# Patient Record
Sex: MALE | Race: WHITE | HISPANIC OR LATINO | URBAN - METROPOLITAN AREA
[De-identification: names, ages, dates, MRNs, and addresses within clinical notes are randomized per-mention and may not be internally consistent; named-entity substitution may affect disease eponyms.]

---

## 2019-10-31 ENCOUNTER — OUTPATIENT (OUTPATIENT)
Dept: OUTPATIENT SERVICES | Facility: HOSPITAL | Age: 59
LOS: 1 days | Discharge: HOME | End: 2019-10-31
Payer: COMMERCIAL

## 2019-10-31 DIAGNOSIS — R07.9 CHEST PAIN, UNSPECIFIED: ICD-10-CM

## 2019-10-31 PROCEDURE — 71046 X-RAY EXAM CHEST 2 VIEWS: CPT | Mod: 26

## 2022-10-26 ENCOUNTER — APPOINTMENT (OUTPATIENT)
Dept: HEMATOLOGY ONCOLOGY | Facility: CLINIC | Age: 62
End: 2022-10-26

## 2022-10-26 VITALS
DIASTOLIC BLOOD PRESSURE: 73 MMHG | TEMPERATURE: 98 F | BODY MASS INDEX: 32.4 KG/M2 | HEIGHT: 66.5 IN | SYSTOLIC BLOOD PRESSURE: 139 MMHG | HEART RATE: 82 BPM | WEIGHT: 204 LBS

## 2022-10-26 DIAGNOSIS — E78.00 PURE HYPERCHOLESTEROLEMIA, UNSPECIFIED: ICD-10-CM

## 2022-10-26 DIAGNOSIS — Z80.0 FAMILY HISTORY OF MALIGNANT NEOPLASM OF DIGESTIVE ORGANS: ICD-10-CM

## 2022-10-26 DIAGNOSIS — I10 ESSENTIAL (PRIMARY) HYPERTENSION: ICD-10-CM

## 2022-10-26 DIAGNOSIS — E04.1 NONTOXIC SINGLE THYROID NODULE: ICD-10-CM

## 2022-10-26 DIAGNOSIS — Z87.891 PERSONAL HISTORY OF NICOTINE DEPENDENCE: ICD-10-CM

## 2022-10-26 LAB
ALBUMIN SERPL ELPH-MCNC: 4.7 G/DL
ALP BLD-CCNC: 97 U/L
ALT SERPL-CCNC: 54 U/L
ANION GAP SERPL CALC-SCNC: 10 MMOL/L
AST SERPL-CCNC: 29 U/L
BASOPHILS # BLD AUTO: 0.09 K/UL
BASOPHILS NFR BLD AUTO: 1 %
BILIRUB DIRECT SERPL-MCNC: <0.2 MG/DL
BILIRUB INDIRECT SERPL-MCNC: >0.3 MG/DL
BILIRUB SERPL-MCNC: 0.5 MG/DL
BUN SERPL-MCNC: 15 MG/DL
CALCIUM SERPL-MCNC: 10.2 MG/DL
CHLORIDE SERPL-SCNC: 104 MMOL/L
CO2 SERPL-SCNC: 25 MMOL/L
CREAT SERPL-MCNC: 1 MG/DL
CRP SERPL-MCNC: 3 MG/L
EGFR: 85 ML/MIN/1.73M2
EOSINOPHIL # BLD AUTO: 0.21 K/UL
EOSINOPHIL NFR BLD AUTO: 2.4 %
ERYTHROCYTE [SEDIMENTATION RATE] IN BLOOD BY WESTERGREN METHOD: 6 MM/HR
GLUCOSE SERPL-MCNC: 101 MG/DL
HCT VFR BLD CALC: 44.2 %
HGB BLD-MCNC: 15.3 G/DL
IMM GRANULOCYTES NFR BLD AUTO: 0.7 %
IRON SATN MFR SERPL: 32 %
IRON SERPL-MCNC: 103 UG/DL
LYMPHOCYTES # BLD AUTO: 2.03 K/UL
LYMPHOCYTES NFR BLD AUTO: 23.4 %
MAN DIFF?: NORMAL
MCHC RBC-ENTMCNC: 28.9 PG
MCHC RBC-ENTMCNC: 34.6 G/DL
MCV RBC AUTO: 83.4 FL
MONOCYTES # BLD AUTO: 0.76 K/UL
MONOCYTES NFR BLD AUTO: 8.8 %
NEUTROPHILS # BLD AUTO: 5.53 K/UL
NEUTROPHILS NFR BLD AUTO: 63.7 %
PLATELET # BLD AUTO: 575 K/UL
POTASSIUM SERPL-SCNC: 5.4 MMOL/L
PROT SERPL-MCNC: 7.5 G/DL
RBC # BLD: 5.3 M/UL
RBC # FLD: 13.3 %
SODIUM SERPL-SCNC: 139 MMOL/L
T3 SERPL-MCNC: 126 NG/DL
T4 FREE SERPL-MCNC: 1.4 NG/DL
TIBC SERPL-MCNC: 325 UG/DL
TSH SERPL-ACNC: 2.45 UIU/ML
UIBC SERPL-MCNC: 222 UG/DL
WBC # FLD AUTO: 8.68 K/UL

## 2022-10-26 PROCEDURE — 99205 OFFICE O/P NEW HI 60 MIN: CPT

## 2022-10-26 NOTE — CONSULT LETTER
[Dear  ___] : Dear  [unfilled], [Consult Letter:] : I had the pleasure of evaluating your patient, [unfilled]. [Please see my note below.] : Please see my note below. [Sincerely,] : Sincerely, [FreeTextEntry3] : Jasper Jon

## 2022-10-26 NOTE — ASSESSMENT
[FreeTextEntry1] : # Thrombocytosis, noted since at least 2022\par - Labwork today: CBC, BMP, LFTs, JAK2, CALR, MPL, ESR, CRP, BCR-ABL, TSH, FT4, T3, flow cytometry, iron studies, ferritin level \par - US Abd ordered to r/o liver or spleen involvement , Rx given \par - requested copy of CT scan done in the last 3 years or so \par \par # Family history of Colon Cancer \par - father diagnosed in his 40s and this is his cause of death\par - sister diagnosed in her 50s and in remission \par - briefly discussed role of genetic counseling due to diagnosis of malignancy at early age ; patient verbalized understanding and states his family is not interested at this time \par \par RTC in 2 weeks , sooner if needed \par \par SEEN/examined w/ NP Ruth; note reviewed; case discussed\par primary vs secondary thrombocytosis\par will get labs and abdomen sono\par answered all questions

## 2022-10-26 NOTE — REVIEW OF SYSTEMS
[Negative] : Allergic/Immunologic [Fever] : no fever [Night Sweats] : no night sweats [Recent Change In Weight] : ~T no recent weight change [Chest Pain] : no chest pain [Shortness Of Breath] : no shortness of breath [Abdominal Pain] : no abdominal pain [Joint Pain] : no joint pain [Easy Bleeding] : no tendency for easy bleeding

## 2022-10-26 NOTE — HISTORY OF PRESENT ILLNESS
[de-identified] : Mr. KELTON CUETO is a 62 year old male here today for evaluation and management of thrombocytosis.\par \par He was referred by PCP, Dr. Garrett.  KELTON is a 62 year old M with PMHx including HTN, hypercholesterolemia, thyroid nodule and anxiety, who presents to clinic to establish care.   He follows with [facility] and was noted to have elevated PLTs which were then confirmed by PCP at a later date.  He is presently feeling well with no new complaints.  Patient denies fever, chills, nausea, vomiting, dyspnea, acute changes in vision, increasing frequent/severity of headaches, skin rash, joint aches, unintentional weight loss or bleeding.  Patient reports family history of malignancy in his father (dx in his 40s - colon cancer) and sister (colon cancer - dx in her 50s).\par \par RADIOLOGIC WORKUP\par CXR (11.1.2019) IMPRESSION:  No radiographic evidence of acute pulmonary disease.\par \par LAB WORKUP\par (9.7.2022) WBC 10.38, Hgb 15.4, MCV 86.2, RDW 13.2, , ANC 6.83, Mono 0.95\par (8.11.2022) WBC 10.14, Hgb 16.9 MCV 87.2, RDW 13.5, , ANC 6.6, Lymph 2.24, Mono 0.9\par (7.31.2022) WBC 8.6, Hgb 16.4, \par \par HCM\par Colonoscopy done 2020 with Dr. Ayala reportedly benign \par COVID Vaccinated

## 2022-10-27 LAB — FERRITIN SERPL-MCNC: 281 NG/ML

## 2022-10-28 ENCOUNTER — OUTPATIENT (OUTPATIENT)
Dept: OUTPATIENT SERVICES | Facility: HOSPITAL | Age: 62
LOS: 1 days | Discharge: HOME | End: 2022-10-28

## 2022-10-28 DIAGNOSIS — D75.839 THROMBOCYTOSIS, UNSPECIFIED: ICD-10-CM

## 2022-10-28 LAB
MPL EXON 10 MUTATION: NORMAL
T(9;22)(ABL1,BCR)/CONTROL BLD/T: NORMAL

## 2022-10-28 PROCEDURE — 76700 US EXAM ABDOM COMPLETE: CPT | Mod: 26

## 2022-10-31 ENCOUNTER — TRANSCRIPTION ENCOUNTER (OUTPATIENT)
Age: 62
End: 2022-10-31

## 2022-11-01 LAB — GENE XXX MUT ANL BLD/T: NORMAL

## 2022-11-04 LAB
EXTRACTION: NORMAL
JAK2 P.V617F BLD/T QL: ABNORMAL
LAB DIRECTOR NAME PROVIDER: NORMAL
LABORATORY COMMENT REPORT: NORMAL
REFLEX:: NORMAL

## 2022-11-29 ENCOUNTER — APPOINTMENT (OUTPATIENT)
Dept: HEMATOLOGY ONCOLOGY | Facility: CLINIC | Age: 62
End: 2022-11-29

## 2022-11-29 VITALS
OXYGEN SATURATION: 99 % | RESPIRATION RATE: 16 BRPM | WEIGHT: 212 LBS | DIASTOLIC BLOOD PRESSURE: 83 MMHG | HEART RATE: 78 BPM | BODY MASS INDEX: 33.67 KG/M2 | SYSTOLIC BLOOD PRESSURE: 136 MMHG | TEMPERATURE: 98.2 F | HEIGHT: 66.5 IN

## 2022-11-29 PROCEDURE — 99215 OFFICE O/P EST HI 40 MIN: CPT

## 2022-11-29 NOTE — ASSESSMENT
[FreeTextEntry1] : # JAK2 (+) Essential Thrombocytosis \par - reviewed radiologic workup and labs report including implications of diagnosis and management using baby ASA + cytoreductive therapy\par - US Abd 10/2022 shows heterogeneous echotexture of the liver, suggestive of fatty liver \par - START Hydrea 500mg once daily ; side effects discussed, written information provided + Rx sent ; Tasked Clinical Pharmacist, Shyla Lemus, to contact patient to ensure receipt of medication and reiterate instructions/adverse event profile.\par - c/w baby ASA daily , no refills needed \par \par # Family history of Colon Cancer \par - father diagnosed in his 40s and this is his cause of death\par - sister diagnosed in her 50s and in remission \par - briefly discussed role of genetic counseling due to diagnosis of malignancy at early age ; patient verbalized understanding and states his family is not interested at this time \par \par Encouraged to achieve healthy body weight through lifestyle modification which may include increased physical activity, as tolerated, and dietary modifications/portion control.\par \par RTC in 2 weeks with CBC , sooner if needed \par \par seen/examined w/ NP Ruth; note reviewed;case discussed\par 63 yo man with ECOG 1 was found to have thrombocytosis (platelets count is in 500K). \par JAK2 positive myeloproliferative neoplasm/ essential thrombocytosis, \par in view of the age (>61 yo) and JAK2 status, pt has a  high risk \par - explained that pt has fatty liver among other comorbidities which could contribute to elevated platelets count\par - he has no anemia and no splenomegaly; he has no B symptoms; his platelets count (500Ks) \par - explained that this entity is characterized as a neoplasm; however, the prognosis should be encouraging\par - to prevent arterial thrombosis, continue ASA 81MG\par - to decrease the risk of venous thrombosis, start hydrea 500mg daily; script sent 11/29/22; f/u in 2 weeks\par - explained that if left untreated and in those patients who have an advanced presentation, this entity could lead to acute leukemia, myelofibrosis, among other condidtions; however, he is far from this presentation; \par -he appeared to be emotionally affected upon learning the diagnosis\par - will follow closely

## 2022-11-29 NOTE — HISTORY OF PRESENT ILLNESS
[de-identified] : Mr. KELTON CUETO is a 62 year old male here today for evaluation and management of thrombocytosis.\par \par He was referred by PCP, Dr. Garrett.  KELTON is a 62 year old M with PMHx including HTN, hypercholesterolemia, YESSICA, asthma, GERD, thyroid nodule and anxiety, who presents to clinic to establish care.   He follows with [facility] and was noted to have elevated PLTs which were then confirmed by PCP at a later date.  He is presently feeling well with no new complaints.  Patient denies fever, chills, nausea, vomiting, dyspnea, acute changes in vision, increasing frequent/severity of headaches, skin rash, joint aches, unintentional weight loss or bleeding.  Patient reports family history of malignancy in his father (dx in his 40s - colon cancer) and sister (colon cancer - dx in her 50s).\par \par RADIOLOGIC WORKUP\par CXR (11.1.2019) IMPRESSION:  No radiographic evidence of acute pulmonary disease.\par \par LAB WORKUP\par (9.7.2022) WBC 10.38, Hgb 15.4, MCV 86.2, RDW 13.2, , ANC 6.83, Mono 0.95\par (8.11.2022) WBC 10.14, Hgb 16.9 MCV 87.2, RDW 13.5, , ANC 6.6, Lymph 2.24, Mono 0.9\par (7.31.2022) WBC 8.6, Hgb 16.4, \par \par HCM\par Colonoscopy done 2020 with Dr. Ayala reportedly benign \par COVID Vaccinated  [de-identified] : 11/29/22\par Patient is here for a follow-up visit for thrombocytosis.  He is feeling well with no new complaints.  Reviewed most recent CBC, which showed persistent thrombocytosis with PLTs 575,000.  Additional workup reveals JAK2 (+), establishing diagnosis of Essential Thrombocytosis.  Reviewed US Abd which shows heterogeneous echotexture of the liver.\par US Abd (10.28.2022) IMPRESSION:Heterogeneous echotexture of the liver.Bilateral nonobstructing nephrolithiasis.

## 2022-11-29 NOTE — REASON FOR VISIT
[Thrombocytosis] : thrombocytosis [Follow-Up Visit] : a follow-up visit for [FreeTextEntry2] : JAK2 (+) ET

## 2022-12-12 ENCOUNTER — APPOINTMENT (OUTPATIENT)
Dept: HEMATOLOGY ONCOLOGY | Facility: CLINIC | Age: 62
End: 2022-12-12

## 2022-12-12 DIAGNOSIS — Z00.00 ENCOUNTER FOR GENERAL ADULT MEDICAL EXAMINATION W/OUT ABNORMAL FINDINGS: ICD-10-CM

## 2022-12-13 ENCOUNTER — APPOINTMENT (OUTPATIENT)
Dept: HEMATOLOGY ONCOLOGY | Facility: CLINIC | Age: 62
End: 2022-12-13

## 2022-12-13 VITALS
HEART RATE: 97 BPM | RESPIRATION RATE: 16 BRPM | DIASTOLIC BLOOD PRESSURE: 97 MMHG | TEMPERATURE: 97.9 F | BODY MASS INDEX: 33.03 KG/M2 | WEIGHT: 208 LBS | SYSTOLIC BLOOD PRESSURE: 163 MMHG | HEIGHT: 66.5 IN | OXYGEN SATURATION: 98 %

## 2022-12-13 LAB
BASOPHILS # BLD AUTO: 0.08 K/UL
BASOPHILS NFR BLD AUTO: 0.7 %
EOSINOPHIL # BLD AUTO: 0.29 K/UL
EOSINOPHIL NFR BLD AUTO: 2.7 %
HCT VFR BLD CALC: 44.8 %
HGB BLD-MCNC: 14.9 G/DL
IMM GRANULOCYTES NFR BLD AUTO: 0.5 %
LYMPHOCYTES # BLD AUTO: 2.29 K/UL
LYMPHOCYTES NFR BLD AUTO: 21 %
MAN DIFF?: NORMAL
MCHC RBC-ENTMCNC: 28.8 PG
MCHC RBC-ENTMCNC: 33.3 G/DL
MCV RBC AUTO: 86.5 FL
MONOCYTES # BLD AUTO: 0.86 K/UL
MONOCYTES NFR BLD AUTO: 7.9 %
NEUTROPHILS # BLD AUTO: 7.34 K/UL
NEUTROPHILS NFR BLD AUTO: 67.2 %
PLATELET # BLD AUTO: 600 K/UL
RBC # BLD: 5.18 M/UL
RBC # FLD: 13.3 %
WBC # FLD AUTO: 10.91 K/UL

## 2022-12-13 PROCEDURE — 99214 OFFICE O/P EST MOD 30 MIN: CPT

## 2022-12-13 NOTE — ASSESSMENT
[FreeTextEntry1] : # JAK2 (+) Essential Thrombocytosis \par - reviewed radiologic workup and labs report including implications of diagnosis and management using baby ASA + cytoreductive therapy\par - US Abd 10/2022 shows heterogeneous echotexture of the liver, suggestive of fatty liver \par - c/w Hydrea 500mg once daily, initiated 12.3.2022\par - if no improvement in PLTs by next visit, will increase to 1 tab alternating with 2 tabs every other day \par - c/w baby ASA daily , no refills needed \par \par # Family history of Colon Cancer \par - father diagnosed in his 40s and this is his cause of death\par - sister diagnosed in her 50s and in remission \par - briefly discussed role of genetic counseling due to diagnosis of malignancy at early age ; patient verbalized understanding and states his family is not interested at this time \par \par Encouraged to achieve healthy body weight through lifestyle modification which may include increased physical activity, as tolerated, and dietary modifications/portion control.\par \par RTC in 3 weeks with SMART NP with CBC prior\par RTC in 6 weeks with Dr. Jon with CBC, BMP, LFTs prior \par \par seen/examined w/ NP CLance; note reviewed;case discussed\par 61 yo man with ECOG 1 was found to have thrombocytosis (platelets count is in 500K). \par JAK2 positive myeloproliferative neoplasm/ essential thrombocytosis, \par in view of the age (>59 yo) and JAK2 status, pt has a  high risk \par - explained that pt has fatty liver among other comorbidities which could contribute to elevated platelets count\par - he has no anemia and no splenomegaly; he has no B symptoms; his platelets count (500Ks) \par - explained that this entity is characterized as a neoplasm; however, the prognosis should be encouraging\par - to prevent arterial thrombosis, continue ASA 81MG\par - to decrease the risk of venous thrombosis, initiatated daily  hydrea 500mg 12/3/22; platelets count remain the same; continue at the same dose and f/u in 3-4 weeks; will adjust pending platelets results\par - explained that if left untreated and in those patients who have an advanced presentation, this entity could lead to acute leukemia, myelofibrosis, among other condidtions; however, he is far from this presentation; \par -he appeared to be emotionally affected upon learning the diagnosis\par

## 2022-12-13 NOTE — HISTORY OF PRESENT ILLNESS
[de-identified] : Mr. KELTON CUETO is a 62 year old male here today for evaluation and management of thrombocytosis.\par \par He was referred by PCP, Dr. Garrett.  KELTON is a 62 year old M with PMHx including HTN, hypercholesterolemia, YESSICA, asthma, GERD, thyroid nodule and anxiety, who presents to clinic to establish care.   He follows with [facility] and was noted to have elevated PLTs which were then confirmed by PCP at a later date.  He is presently feeling well with no new complaints.  Patient denies fever, chills, nausea, vomiting, dyspnea, acute changes in vision, increasing frequent/severity of headaches, skin rash, joint aches, unintentional weight loss or bleeding.  Patient reports family history of malignancy in his father (dx in his 40s - colon cancer) and sister (colon cancer - dx in her 50s).\par \par RADIOLOGIC WORKUP\par CXR (11.1.2019) IMPRESSION:  No radiographic evidence of acute pulmonary disease.\par \par LAB WORKUP\par (9.7.2022) WBC 10.38, Hgb 15.4, MCV 86.2, RDW 13.2, , ANC 6.83, Mono 0.95\par (8.11.2022) WBC 10.14, Hgb 16.9 MCV 87.2, RDW 13.5, , ANC 6.6, Lymph 2.24, Mono 0.9\par (7.31.2022) WBC 8.6, Hgb 16.4, \par \par HCM\par Colonoscopy done 2020 with Dr. Ayala reportedly benign \par COVID Vaccinated  [de-identified] : 11/29/22\par Patient is here for a follow-up visit for thrombocytosis.  He is feeling well with no new complaints.  Reviewed most recent CBC, which showed persistent thrombocytosis with PLTs 575,000.  Additional workup reveals JAK2 (+), establishing diagnosis of Essential Thrombocytosis.  Reviewed US Abd which shows heterogeneous echotexture of the liver.\par US Abd (10.28.2022) IMPRESSION:Heterogeneous echotexture of the liver.Bilateral nonobstructing nephrolithiasis.\par \par 12/13/22\par Patient is here for a follow-up visit for ET.  He is feeling well with no new complaints.  Reviewed most recent CBC, which shows persistent thrombocytosis with PLTs 600,000.  He initiated Hydrea 500mg once daily on 12.3.2022.  Patient endorses minor cramps, but they are not bothersome.

## 2022-12-13 NOTE — REASON FOR VISIT
[Follow-Up Visit] : a follow-up visit for [Thrombocytosis] : thrombocytosis [FreeTextEntry2] : JAK2 (+) ET

## 2023-01-11 ENCOUNTER — LABORATORY RESULT (OUTPATIENT)
Age: 63
End: 2023-01-11

## 2023-01-11 ENCOUNTER — APPOINTMENT (OUTPATIENT)
Dept: HEMATOLOGY ONCOLOGY | Facility: CLINIC | Age: 63
End: 2023-01-11
Payer: COMMERCIAL

## 2023-01-11 ENCOUNTER — OUTPATIENT (OUTPATIENT)
Dept: OUTPATIENT SERVICES | Facility: HOSPITAL | Age: 63
LOS: 1 days | End: 2023-01-11

## 2023-01-11 VITALS
BODY MASS INDEX: 32.87 KG/M2 | HEART RATE: 89 BPM | WEIGHT: 207 LBS | HEIGHT: 66.5 IN | TEMPERATURE: 97.6 F | SYSTOLIC BLOOD PRESSURE: 143 MMHG | OXYGEN SATURATION: 98 % | DIASTOLIC BLOOD PRESSURE: 91 MMHG

## 2023-01-11 DIAGNOSIS — Z51.81 ENCOUNTER FOR THERAPEUTIC DRUG LEVEL MONITORING: ICD-10-CM

## 2023-01-11 DIAGNOSIS — D47.3 ESSENTIAL (HEMORRHAGIC) THROMBOCYTHEMIA: ICD-10-CM

## 2023-01-11 DIAGNOSIS — D75.839 THROMBOCYTOSIS, UNSPECIFIED: ICD-10-CM

## 2023-01-11 LAB
HCT VFR BLD CALC: 47.4 %
HGB BLD-MCNC: 15.8 G/DL
MCHC RBC-ENTMCNC: 28.5 PG
MCHC RBC-ENTMCNC: 33.3 G/DL
MCV RBC AUTO: 85.4 FL
PLATELET # BLD AUTO: 384 K/UL
PMV BLD: 10.4 FL
RBC # BLD: 5.55 M/UL
RBC # FLD: 14.7 %
WBC # FLD AUTO: 9.85 K/UL

## 2023-01-11 PROCEDURE — 99214 OFFICE O/P EST MOD 30 MIN: CPT

## 2023-01-11 NOTE — HISTORY OF PRESENT ILLNESS
[de-identified] : Mr. KELTON CUETO is a 62 year old male here today for evaluation and management of thrombocytosis.\par \par He was referred by PCP, Dr. Garrett.  KELTON is a 62 year old M with PMHx including HTN, hypercholesterolemia, YESSICA, asthma, GERD, thyroid nodule and anxiety, who presents to clinic to establish care.   He follows with [facility] and was noted to have elevated PLTs which were then confirmed by PCP at a later date.  He is presently feeling well with no new complaints.  Patient denies fever, chills, nausea, vomiting, dyspnea, acute changes in vision, increasing frequent/severity of headaches, skin rash, joint aches, unintentional weight loss or bleeding.  Patient reports family history of malignancy in his father (dx in his 40s - colon cancer) and sister (colon cancer - dx in her 50s).\par \par RADIOLOGIC WORKUP\par CXR (11.1.2019) IMPRESSION:  No radiographic evidence of acute pulmonary disease.\par \par LAB WORKUP\par (9.7.2022) WBC 10.38, Hgb 15.4, MCV 86.2, RDW 13.2, , ANC 6.83, Mono 0.95\par (8.11.2022) WBC 10.14, Hgb 16.9 MCV 87.2, RDW 13.5, , ANC 6.6, Lymph 2.24, Mono 0.9\par (7.31.2022) WBC 8.6, Hgb 16.4, \par \par HCM\par Colonoscopy done 2020 with Dr. Ayala reportedly benign \par COVID Vaccinated  [de-identified] : 11/29/22\par Patient is here for a follow-up visit for thrombocytosis.  He is feeling well with no new complaints.  Reviewed most recent CBC, which showed persistent thrombocytosis with PLTs 575,000.  Additional workup reveals JAK2 (+), establishing diagnosis of Essential Thrombocytosis.  Reviewed US Abd which shows heterogeneous echotexture of the liver.\par US Abd (10.28.2022) IMPRESSION:Heterogeneous echotexture of the liver.Bilateral nonobstructing nephrolithiasis.\par \par 12/13/22\par Patient is here for a follow-up visit for ET.  He is feeling well with no new complaints.  Reviewed most recent CBC, which shows persistent thrombocytosis with PLTs 600,000.  He initiated Hydrea 500mg once daily on 12.3.2022.  Patient endorses minor cramps, but they are not bothersome.  \par \par 1/11/23\par Patient is here for a follow-up visit for ET.  He is feeling well with no new complaints.  Reviewed most recent CBC, which shows persistent thrombocytosis with PLTs 384,000.  He continues on Hydrea 500mg once daily, initiated on 12.3.2022.  Patient denies fever, chills, nausea, vomiting, GI upset, CP, palpitations, dyspnea, ulcers or bleeding.

## 2023-01-11 NOTE — CONSULT LETTER
[Dear  ___] : Dear  [unfilled], [Consult Letter:] : I had the pleasure of evaluating your patient, [unfilled]. [Please see my note below.] : Please see my note below. [Sincerely,] : Sincerely, [FreeTextEntry3] : Jasper Jon\toño Boyle NP

## 2023-01-11 NOTE — ASSESSMENT
[FreeTextEntry1] : # JAK2 (+) Essential Thrombocytosis \par - reviewed radiologic workup and labs report including implications of diagnosis and management using baby ASA + cytoreductive therapy\par - US Abd 10/2022 shows heterogeneous echotexture of the liver, suggestive of fatty liver \par - c/w Hydrea 500mg once daily, initiated 12.3.2022\par - if no improvement in PLTs by next visit, will increase to 1 tab alternating with 2 tabs every other day \par - c/w baby ASA daily , no refills needed \par \par # Family history of Colon Cancer \par - father diagnosed in his 40s and this is his cause of death\par - sister diagnosed in her 50s and in remission \par - briefly discussed role of genetic counseling due to diagnosis of malignancy at early age ; patient verbalized understanding and states his family is not interested at this time \par \par Encouraged to achieve healthy body weight through lifestyle modification which may include increased physical activity, as tolerated, and dietary modifications/portion control. \par \par RTC in 3 weeks with Dr. Jon with CBC, BMP, LFTs prior \par \par 63 yo man with ECOG 1 was found to have thrombocytosis (platelets count is in 500K). \par JAK2 positive myeloproliferative neoplasm/ essential thrombocytosis, \par in view of the age (>61 yo) and JAK2 status, pt has a  high risk \par - explained that pt has fatty liver among other comorbidities which could contribute to elevated platelets count\par - he has no anemia and no splenomegaly; he has no B symptoms; his platelets count (500Ks) \par - explained that this entity is characterized as a neoplasm; however, the prognosis should be encouraging\par - to prevent arterial thrombosis, continue ASA 81MG\par - to decrease the risk of venous thrombosis, initiatated daily  hydrea 500mg 12/3/22; platelets count remain the same; continue at the same dose and f/u in 3-4 weeks; will adjust pending platelets results\par - explained that if left untreated and in those patients who have an advanced presentation, this entity could lead to acute leukemia, myelofibrosis, among other condidtions; however, he is far from this presentation

## 2023-01-31 ENCOUNTER — APPOINTMENT (OUTPATIENT)
Dept: HEMATOLOGY ONCOLOGY | Facility: CLINIC | Age: 63
End: 2023-01-31

## 2023-02-21 ENCOUNTER — OUTPATIENT (OUTPATIENT)
Dept: OUTPATIENT SERVICES | Facility: HOSPITAL | Age: 63
LOS: 1 days | End: 2023-02-21
Payer: COMMERCIAL

## 2023-02-21 ENCOUNTER — APPOINTMENT (OUTPATIENT)
Dept: HEMATOLOGY ONCOLOGY | Facility: CLINIC | Age: 63
End: 2023-02-21

## 2023-02-21 ENCOUNTER — LABORATORY RESULT (OUTPATIENT)
Age: 63
End: 2023-02-21

## 2023-02-21 ENCOUNTER — APPOINTMENT (OUTPATIENT)
Dept: HEMATOLOGY ONCOLOGY | Facility: CLINIC | Age: 63
End: 2023-02-21
Payer: COMMERCIAL

## 2023-02-21 VITALS
HEIGHT: 65 IN | TEMPERATURE: 98 F | RESPIRATION RATE: 16 BRPM | HEART RATE: 86 BPM | DIASTOLIC BLOOD PRESSURE: 112 MMHG | SYSTOLIC BLOOD PRESSURE: 159 MMHG | BODY MASS INDEX: 34.66 KG/M2 | WEIGHT: 208 LBS

## 2023-02-21 DIAGNOSIS — D47.3 ESSENTIAL (HEMORRHAGIC) THROMBOCYTHEMIA: ICD-10-CM

## 2023-02-21 LAB
ALBUMIN SERPL ELPH-MCNC: 4.6 G/DL
ALP BLD-CCNC: 89 U/L
ALT SERPL-CCNC: 28 U/L
ANION GAP SERPL CALC-SCNC: 12 MMOL/L
AST SERPL-CCNC: 25 U/L
BILIRUB SERPL-MCNC: 0.6 MG/DL
BUN SERPL-MCNC: 15 MG/DL
CALCIUM SERPL-MCNC: 9.5 MG/DL
CHLORIDE SERPL-SCNC: 102 MMOL/L
CO2 SERPL-SCNC: 23 MMOL/L
CREAT SERPL-MCNC: 0.9 MG/DL
EGFR: 97 ML/MIN/1.73M2
GLUCOSE SERPL-MCNC: 98 MG/DL
HCT VFR BLD CALC: 43.5 %
HGB BLD-MCNC: 14.9 G/DL
MCHC RBC-ENTMCNC: 30.3 PG
MCHC RBC-ENTMCNC: 34.3 G/DL
MCV RBC AUTO: 88.4 FL
PLATELET # BLD AUTO: 368 K/UL
PMV BLD: 9.1 FL
POTASSIUM SERPL-SCNC: 5.1 MMOL/L
PROT SERPL-MCNC: 7.2 G/DL
RBC # BLD: 4.92 M/UL
RBC # FLD: 16 %
SODIUM SERPL-SCNC: 137 MMOL/L
WBC # FLD AUTO: 5.33 K/UL

## 2023-02-21 PROCEDURE — 99214 OFFICE O/P EST MOD 30 MIN: CPT

## 2023-02-21 PROCEDURE — 80053 COMPREHEN METABOLIC PANEL: CPT

## 2023-02-21 PROCEDURE — 85027 COMPLETE CBC AUTOMATED: CPT

## 2023-02-21 NOTE — ASSESSMENT
[FreeTextEntry1] : # JAK2 (+) Essential Thrombocytosis \par - reviewed radiologic workup and labs report including implications of diagnosis and management using baby ASA + cytoreductive therapy \par - US Abd 10/2022 shows heterogeneous echotexture of the liver, suggestive of fatty liver \par - HOLD Hydrea 500mg due to fatigue/weakness as of 2/21/23 , initiated 12.3.2022 \par - c/w baby ASA daily , no refills needed \par \par # Family history of Colon Cancer \par - father diagnosed in his 40s and this is his cause of death \par - sister diagnosed in her 50s and in remission \par - briefly discussed role of genetic counseling due to diagnosis of malignancy at early age ; patient verbalized understanding and states his family is not interested at this time \par \par Encouraged to achieve healthy body weight through lifestyle modification which may include increased physical activity, as tolerated, and dietary modifications/portion control. \par \par RTC in 3 weeks with CBC, BMP, LFTs prior \par \par seen/ examined w/ NP C[facility]; note reviewed; case discussed\par 63 yo man with ECOG 1 was found to have thrombocytosis (platelets count is in 500K). \par JAK2 positive myeloproliferative neoplasm/ essential thrombocytosis, \par in view of the age (>61 yo) and JAK2 status, pt has a  high risk \par - explained that pt has fatty liver among other comorbidities which could contribute to elevated platelets count\par - he has no anemia and no splenomegaly; he has no B symptoms; his platelets count (500Ks) \par - explained that this entity is characterized as a neoplasm; however, the prognosis should be encouraging\par - to prevent arterial thrombosis, continue ASA 81MG\par - to decrease the risk of venous thrombosis, initiatated daily  hydrea 500mg 12/3/22; platelets count remain the same; continue at the same dose and f/u in 3-4 weeks; will adjust pending platelets results\par - explained that if left untreated and in those patients who have an advanced presentation, this entity could lead to acute leukemia, myelofibrosis, among other condidtions; however, he is far from this presentation\par \par as of 2/21/23, he c/o weakness and GI symtpoms and is concerned if hydrea is the cause: i recommend to HOLD hydrea for 3 weeks to assess if this represents the etiology; f/u labs after that

## 2023-02-21 NOTE — HISTORY OF PRESENT ILLNESS
[de-identified] : Mr. KELTON CUETO is a 62 year old male here today for evaluation and management of thrombocytosis.\par \par He was referred by PCP, Dr. Garrett.  KELTON is a 62 year old M with PMHx including HTN, hypercholesterolemia, YESSICA, asthma, GERD, thyroid nodule and anxiety, who presents to clinic to establish care.   He follows with [facility] and was noted to have elevated PLTs which were then confirmed by PCP at a later date.  He is presently feeling well with no new complaints.  Patient denies fever, chills, nausea, vomiting, dyspnea, acute changes in vision, increasing frequent/severity of headaches, skin rash, joint aches, unintentional weight loss or bleeding.  Patient reports family history of malignancy in his father (dx in his 40s - colon cancer) and sister (colon cancer - dx in her 50s).\par \par RADIOLOGIC WORKUP\par CXR (11.1.2019) IMPRESSION:  No radiographic evidence of acute pulmonary disease.\par \par LAB WORKUP\par (9.7.2022) WBC 10.38, Hgb 15.4, MCV 86.2, RDW 13.2, , ANC 6.83, Mono 0.95\par (8.11.2022) WBC 10.14, Hgb 16.9 MCV 87.2, RDW 13.5, , ANC 6.6, Lymph 2.24, Mono 0.9\par (7.31.2022) WBC 8.6, Hgb 16.4, \par \par HCM\par Colonoscopy done 2020 with Dr. Ayala reportedly benign \par COVID Vaccinated  [de-identified] : 11/29/22\par Patient is here for a follow-up visit for thrombocytosis.  He is feeling well with no new complaints.  Reviewed most recent CBC, which showed persistent thrombocytosis with PLTs 575,000.  Additional workup reveals JAK2 (+), establishing diagnosis of Essential Thrombocytosis.  Reviewed US Abd which shows heterogeneous echotexture of the liver.\par US Abd (10.28.2022) IMPRESSION:Heterogeneous echotexture of the liver.Bilateral nonobstructing nephrolithiasis.\par \par 12/13/22\par Patient is here for a follow-up visit for ET.  He is feeling well with no new complaints.  Reviewed most recent CBC, which shows persistent thrombocytosis with PLTs 600,000.  He initiated Hydrea 500mg once daily on 12.3.2022.  Patient endorses minor cramps, but they are not bothersome.  \par \par 1/11/23\par Patient is here for a follow-up visit for ET.  He is feeling well with no new complaints.  Reviewed most recent CBC, which shows PLTs 384,000.  He continues on Hydrea 500mg once daily, initiated on 12.3.2022.  Patient denies fever, chills, nausea, vomiting, GI upset, CP, palpitations, dyspnea, ulcers or bleeding.  \par \par 2/21/23\par Patient is here for a follow-up visit for ET.  He is feeling well with no new complaints.  Reviewed most recent CBC, which shows stable PLTs at 368,000.  He states he has been taking Hydrea 500mg twice daily.  Patient denies fever, chills, nausea, vomiting, GI upset, CP, palpitations, dyspnea, ulcers or bleeding.  He endorses more fatigue since being on Hydrea regimen.

## 2023-02-22 DIAGNOSIS — D47.3 ESSENTIAL (HEMORRHAGIC) THROMBOCYTHEMIA: ICD-10-CM

## 2023-03-15 ENCOUNTER — OUTPATIENT (OUTPATIENT)
Dept: OUTPATIENT SERVICES | Facility: HOSPITAL | Age: 63
LOS: 1 days | End: 2023-03-15
Payer: COMMERCIAL

## 2023-03-15 ENCOUNTER — APPOINTMENT (OUTPATIENT)
Dept: HEMATOLOGY ONCOLOGY | Facility: CLINIC | Age: 63
End: 2023-03-15
Payer: COMMERCIAL

## 2023-03-15 ENCOUNTER — LABORATORY RESULT (OUTPATIENT)
Age: 63
End: 2023-03-15

## 2023-03-15 VITALS
DIASTOLIC BLOOD PRESSURE: 92 MMHG | BODY MASS INDEX: 34.66 KG/M2 | HEIGHT: 65 IN | SYSTOLIC BLOOD PRESSURE: 142 MMHG | HEART RATE: 81 BPM | RESPIRATION RATE: 16 BRPM | WEIGHT: 208 LBS | TEMPERATURE: 97.3 F

## 2023-03-15 DIAGNOSIS — D47.3 ESSENTIAL (HEMORRHAGIC) THROMBOCYTHEMIA: ICD-10-CM

## 2023-03-15 DIAGNOSIS — Z14.8 GENETIC CARRIER OF OTHER DISEASE: ICD-10-CM

## 2023-03-15 LAB
HCT VFR BLD CALC: 43.7 %
HGB BLD-MCNC: 15 G/DL
MCHC RBC-ENTMCNC: 30.5 PG
MCHC RBC-ENTMCNC: 34.3 G/DL
MCV RBC AUTO: 89 FL
PLATELET # BLD AUTO: 433 K/UL
PMV BLD: 10.2 FL
RBC # BLD: 4.91 M/UL
RBC # FLD: 14.8 %
WBC # FLD AUTO: 10.16 K/UL

## 2023-03-15 PROCEDURE — 36415 COLL VENOUS BLD VENIPUNCTURE: CPT

## 2023-03-15 PROCEDURE — 85027 COMPLETE CBC AUTOMATED: CPT

## 2023-03-15 PROCEDURE — 99214 OFFICE O/P EST MOD 30 MIN: CPT

## 2023-03-15 NOTE — ASSESSMENT
[FreeTextEntry1] : # JAK2 (+) Essential Thrombocytosis \par - reviewed radiologic workup and labs report including implications of diagnosis and management using baby ASA + cytoreductive therapy \par - US Abd 10/2022 shows heterogeneous echotexture of the liver, suggestive of fatty liver \par - HOLD Hydrea 500mg due to fatigue/weakness as of 2/21/23 , initiated 12.3.2022 \par - c/w baby ASA daily , no refills needed \par \par # Family history of Colon Cancer \par - father diagnosed in his 40s and this is his cause of death \par - sister diagnosed in her 50s and in remission \par - briefly discussed role of genetic counseling due to diagnosis of malignancy at early age ; patient verbalized understanding and states his family is not interested at this time \par \par Encouraged to achieve healthy body weight through lifestyle modification which may include increased physical activity, as tolerated, and dietary modifications/portion control. \par \par RTC in 3 weeks with CBC, BMP, LFTs prior \par \par seen/ examined w/ fellow  note reviewed; case discussed\par 63 yo man with ECOG 1 was found to have thrombocytosis (platelets count is in 500K). \par JAK2 positive myeloproliferative neoplasm/ essential thrombocytosis, \par in view of the age (>61 yo) and JAK2 status, pt has a  high risk \par - explained that pt has fatty liver among other comorbidities which could contribute to elevated platelets count\par - he has no anemia and no splenomegaly; he has no B symptoms\par - explained that this entity is characterized as a neoplasm; however, the prognosis should be encouraging\par - to prevent arterial thrombosis, continue ASA 81MG\par - to decrease the risk of venous thrombosis, initiatated daily  hydrea 500mg 12/3/22;\par - in view of GI symptoms, hydrea was held for 3 weeks to evaluate the etiology; the symptoms improved; platelets count mildly increased to >400K. Recommend to restart hydrea at the schedule 1 tab every 2 days and reevaluate in a few weeks; if symptoms are tolerable, will titrate up to 1 tab every other  day, and so for\par - explained that if left untreated and in those patients who have an advanced presentation, this entity could lead to acute leukemia, myelofibrosis, among other condidtions; however, he is far from this presentation\par . \par -RTC in 5 weeks.\par \par \par \par \par

## 2023-03-15 NOTE — CONSULT LETTER
[Dear  ___] : Dear  [unfilled], [Consult Letter:] : I had the pleasure of evaluating your patient, [unfilled]. [Please see my note below.] : Please see my note below. [Sincerely,] : Sincerely, [FreeTextEntry3] : Jasper Jon DO\par Attending Physician,\par Hematology/ Medical Oncology\par [phone] office\par \par

## 2023-03-15 NOTE — HISTORY OF PRESENT ILLNESS
[de-identified] : Mr. KELTON CUETO is a 62 year old male here today for evaluation and management of thrombocytosis.\par \par He was referred by PCP, Dr. Garrett.  KELTON is a 62 year old M with PMHx including HTN, hypercholesterolemia, YESSICA, asthma, GERD, thyroid nodule and anxiety, who presents to clinic to establish care.   He follows with [facility] and was noted to have elevated PLTs which were then confirmed by PCP at a later date.  He is presently feeling well with no new complaints.  Patient denies fever, chills, nausea, vomiting, dyspnea, acute changes in vision, increasing frequent/severity of headaches, skin rash, joint aches, unintentional weight loss or bleeding.  Patient reports family history of malignancy in his father (dx in his 40s - colon cancer) and sister (colon cancer - dx in her 50s).\par \par RADIOLOGIC WORKUP\par CXR (11.1.2019) IMPRESSION:  No radiographic evidence of acute pulmonary disease.\par \par LAB WORKUP\par (9.7.2022) WBC 10.38, Hgb 15.4, MCV 86.2, RDW 13.2, , ANC 6.83, Mono 0.95\par (8.11.2022) WBC 10.14, Hgb 16.9 MCV 87.2, RDW 13.5, , ANC 6.6, Lymph 2.24, Mono 0.9\par (7.31.2022) WBC 8.6, Hgb 16.4, \par \par HCM\par Colonoscopy done 2020 with Dr. Ayala reportedly benign \par COVID Vaccinated  [de-identified] : 11/29/22\par Patient is here for a follow-up visit for thrombocytosis.  He is feeling well with no new complaints.  Reviewed most recent CBC, which showed persistent thrombocytosis with PLTs 575,000.  Additional workup reveals JAK2 (+), establishing diagnosis of Essential Thrombocytosis.  Reviewed US Abd which shows heterogeneous echotexture of the liver.\par US Abd (10.28.2022) IMPRESSION:Heterogeneous echotexture of the liver.Bilateral nonobstructing nephrolithiasis.\par \par 12/13/22\par Patient is here for a follow-up visit for ET.  He is feeling well with no new complaints.  Reviewed most recent CBC, which shows persistent thrombocytosis with PLTs 600,000.  He initiated Hydrea 500mg once daily on 12.3.2022.  Patient endorses minor cramps, but they are not bothersome.  \par \par 1/11/23\par Patient is here for a follow-up visit for ET.  He is feeling well with no new complaints.  Reviewed most recent CBC, which shows PLTs 384,000.  He continues on Hydrea 500mg once daily, initiated on 12.3.2022.  Patient denies fever, chills, nausea, vomiting, GI upset, CP, palpitations, dyspnea, ulcers or bleeding.  \par \par 2/21/23\par Patient is here for a follow-up visit for ET.  He is feeling well with no new complaints.  Reviewed most recent CBC, which shows stable PLTs at 368,000.  He states he has been taking Hydrea 500mg twice daily.  Patient denies fever, chills, nausea, vomiting, GI upset, CP, palpitations, dyspnea, ulcers or bleeding.  He endorses more fatigue since being on Hydrea regimen.  \par \par 3/15/23\par Patient is here for a follow-up visit for ET.  He is feeling well with no new complaints.  Reviewed most recent CBC, which shows stable PLTs at 433,000.Hydrea was held due to concerns for gi symptoms and weakness caused by hydrea. .  Patient denies fever, chills, nausea, vomiting, GI upset, CP, palpitations, dyspnea, ulcers or bleeding.  \par

## 2023-03-16 DIAGNOSIS — Z14.8 GENETIC CARRIER OF OTHER DISEASE: ICD-10-CM

## 2023-03-20 DIAGNOSIS — D47.3 ESSENTIAL (HEMORRHAGIC) THROMBOCYTHEMIA: ICD-10-CM

## 2023-03-27 ENCOUNTER — NON-APPOINTMENT (OUTPATIENT)
Age: 63
End: 2023-03-27

## 2023-04-18 ENCOUNTER — LABORATORY RESULT (OUTPATIENT)
Age: 63
End: 2023-04-18

## 2023-04-18 ENCOUNTER — APPOINTMENT (OUTPATIENT)
Dept: HEMATOLOGY ONCOLOGY | Facility: CLINIC | Age: 63
End: 2023-04-18
Payer: COMMERCIAL

## 2023-04-18 ENCOUNTER — OUTPATIENT (OUTPATIENT)
Dept: OUTPATIENT SERVICES | Facility: HOSPITAL | Age: 63
LOS: 1 days | End: 2023-04-18
Payer: COMMERCIAL

## 2023-04-18 VITALS
SYSTOLIC BLOOD PRESSURE: 157 MMHG | RESPIRATION RATE: 16 BRPM | HEART RATE: 76 BPM | DIASTOLIC BLOOD PRESSURE: 91 MMHG | HEIGHT: 65 IN | WEIGHT: 205 LBS | BODY MASS INDEX: 34.16 KG/M2 | TEMPERATURE: 97.3 F

## 2023-04-18 DIAGNOSIS — D75.839 THROMBOCYTOSIS, UNSPECIFIED: ICD-10-CM

## 2023-04-18 PROCEDURE — 85027 COMPLETE CBC AUTOMATED: CPT

## 2023-04-18 PROCEDURE — 99214 OFFICE O/P EST MOD 30 MIN: CPT

## 2023-04-18 NOTE — ASSESSMENT
[FreeTextEntry1] : # JAK2 (+) Essential Thrombocytosis \par - reviewed radiologic workup and labs report including implications of diagnosis and management using baby ASA + cytoreductive therapy \par - US Abd 10/2022 shows heterogeneous echotexture of the liver, suggestive of fatty liver \par - HOLD Hydrea 500mg due to fatigue/weakness as of 2/21/23 , initiated 12.3.2022 \par - c/w baby ASA daily , no refills needed \par \par # Family history of Colon Cancer \par - father diagnosed in his 40s and this is his cause of death \par - sister diagnosed in her 50s and in remission \par - briefly discussed role of genetic counseling due to diagnosis of malignancy at early age ; patient verbalized understanding and states his family is not interested at this time \par \par Encouraged to achieve healthy body weight through lifestyle modification which may include increased physical activity, as tolerated, and dietary modifications/portion control. \par \par RTC in 3 weeks with CBC, BMP, LFTs prior \par \par seen/ examined w/ fellow  note reviewed; case discussed\par 63 yo man with ECOG 1 was found to have thrombocytosis (platelets count is in 500K). \par JAK2 positive myeloproliferative neoplasm/ essential thrombocytosis, \par in view of the age (>61 yo) and JAK2 status, pt has a  high risk \par - explained that pt has fatty liver among other comorbidities which could contribute to elevated platelets count\par - he has no anemia and no splenomegaly; he has no B symptoms\par - explained that this entity is characterized as a neoplasm; however, the prognosis should be encouraging\par - to prevent arterial thrombosis, continue ASA 81MG\par - to decrease the risk of venous thrombosis, initiatated daily  hydrea 500mg 12/3/22;\par - in view of GI symptoms, hydrea was held for 3 weeks to evaluate the etiology; the symptoms improved; platelets count mildly increased to >400K.\par \par  On 03/15/2023: Recommend to restart hydrea at the schedule 1 tab every 2 days and reevaluate in a few weeks; on 4/18/23: plats 168K: RECOMMEND TO decrease 1 tab every 3 days and reevaluate in 4 weeks\par - explained that if left untreated and in those patients who have an advanced presentation, this entity could lead to acute leukemia, myelofibrosis, among other condidtions; however, he is far from this presentation\par . \par \par \par \par \par \par

## 2023-04-18 NOTE — CONSULT LETTER
[Dear  ___] : Dear  [unfilled], [Consult Letter:] : I had the pleasure of evaluating your patient, [unfilled]. [Please see my note below.] : Please see my note below. [Sincerely,] : Sincerely, [FreeTextEntry3] : Jasper Jon DO\par Attending Physician,\par Hematology/ Medical Oncology\par [phone] office\par \par

## 2023-04-18 NOTE — HISTORY OF PRESENT ILLNESS
[de-identified] : Mr. KELTON CUETO is a 62 year old male here today for evaluation and management of thrombocytosis.\par \par He was referred by PCP, Dr. Garrett.  KELTON is a 62 year old M with PMHx including HTN, hypercholesterolemia, YESSICA, asthma, GERD, thyroid nodule and anxiety, who presents to clinic to establish care.   He follows with [facility] and was noted to have elevated PLTs which were then confirmed by PCP at a later date.  He is presently feeling well with no new complaints.  Patient denies fever, chills, nausea, vomiting, dyspnea, acute changes in vision, increasing frequent/severity of headaches, skin rash, joint aches, unintentional weight loss or bleeding.  Patient reports family history of malignancy in his father (dx in his 40s - colon cancer) and sister (colon cancer - dx in her 50s).\par \par RADIOLOGIC WORKUP\par CXR (11.1.2019) IMPRESSION:  No radiographic evidence of acute pulmonary disease.\par \par LAB WORKUP\par (9.7.2022) WBC 10.38, Hgb 15.4, MCV 86.2, RDW 13.2, , ANC 6.83, Mono 0.95\par (8.11.2022) WBC 10.14, Hgb 16.9 MCV 87.2, RDW 13.5, , ANC 6.6, Lymph 2.24, Mono 0.9\par (7.31.2022) WBC 8.6, Hgb 16.4, \par \par HCM\par Colonoscopy done 2020 with Dr. Ayala reportedly benign \par COVID Vaccinated  [de-identified] : 11/29/22\par Patient is here for a follow-up visit for thrombocytosis.  He is feeling well with no new complaints.  Reviewed most recent CBC, which showed persistent thrombocytosis with PLTs 575,000.  Additional workup reveals JAK2 (+), establishing diagnosis of Essential Thrombocytosis.  Reviewed US Abd which shows heterogeneous echotexture of the liver.\par US Abd (10.28.2022) IMPRESSION:Heterogeneous echotexture of the liver.Bilateral nonobstructing nephrolithiasis.\par \par 12/13/22\par Patient is here for a follow-up visit for ET.  He is feeling well with no new complaints.  Reviewed most recent CBC, which shows persistent thrombocytosis with PLTs 600,000.  He initiated Hydrea 500mg once daily on 12.3.2022.  Patient endorses minor cramps, but they are not bothersome.  \par \par 1/11/23\par Patient is here for a follow-up visit for ET.  He is feeling well with no new complaints.  Reviewed most recent CBC, which shows PLTs 384,000.  He continues on Hydrea 500mg once daily, initiated on 12.3.2022.  Patient denies fever, chills, nausea, vomiting, GI upset, CP, palpitations, dyspnea, ulcers or bleeding.  \par \par 2/21/23\par Patient is here for a follow-up visit for ET.  He is feeling well with no new complaints.  Reviewed most recent CBC, which shows stable PLTs at 368,000.  He states he has been taking Hydrea 500mg twice daily.  Patient denies fever, chills, nausea, vomiting, GI upset, CP, palpitations, dyspnea, ulcers or bleeding.  He endorses more fatigue since being on Hydrea regimen.  \par \par 3/15/23\par Patient is here for a follow-up visit for ET.  He is feeling well with no new complaints.  Reviewed most recent CBC, which shows stable PLTs at 433,000.Hydrea was held due to concerns for gi symptoms and weakness caused by hydrea. .  Patient denies fever, chills, nausea, vomiting, GI upset, CP, palpitations, dyspnea, ulcers or bleeding.  \par \par 4/18/23\par

## 2023-04-19 DIAGNOSIS — D47.3 ESSENTIAL (HEMORRHAGIC) THROMBOCYTHEMIA: ICD-10-CM

## 2023-04-19 DIAGNOSIS — Z51.81 ENCOUNTER FOR THERAPEUTIC DRUG LEVEL MONITORING: ICD-10-CM

## 2023-04-19 DIAGNOSIS — D75.839 THROMBOCYTOSIS, UNSPECIFIED: ICD-10-CM

## 2023-04-19 LAB
HCT VFR BLD CALC: 43.2 %
HGB BLD-MCNC: 14.9 G/DL
MCHC RBC-ENTMCNC: 30.8 PG
MCHC RBC-ENTMCNC: 34.5 G/DL
MCV RBC AUTO: 89.3 FL
PLATELET # BLD AUTO: 168 K/UL
PMV BLD: 11.2 FL
RBC # BLD: 4.84 M/UL
RBC # FLD: 12.8 %
WBC # FLD AUTO: 9.82 K/UL

## 2023-05-16 ENCOUNTER — LABORATORY RESULT (OUTPATIENT)
Age: 63
End: 2023-05-16

## 2023-05-16 ENCOUNTER — APPOINTMENT (OUTPATIENT)
Dept: HEMATOLOGY ONCOLOGY | Facility: CLINIC | Age: 63
End: 2023-05-16
Payer: COMMERCIAL

## 2023-05-16 ENCOUNTER — OUTPATIENT (OUTPATIENT)
Dept: OUTPATIENT SERVICES | Facility: HOSPITAL | Age: 63
LOS: 1 days | End: 2023-05-16
Payer: COMMERCIAL

## 2023-05-16 VITALS
HEART RATE: 79 BPM | HEIGHT: 66 IN | DIASTOLIC BLOOD PRESSURE: 83 MMHG | WEIGHT: 200 LBS | BODY MASS INDEX: 32.14 KG/M2 | TEMPERATURE: 98 F | SYSTOLIC BLOOD PRESSURE: 137 MMHG | RESPIRATION RATE: 16 BRPM

## 2023-05-16 DIAGNOSIS — D75.839 THROMBOCYTOSIS, UNSPECIFIED: ICD-10-CM

## 2023-05-16 DIAGNOSIS — Z51.81 ENCOUNTER FOR THERAPEUTIC DRUG LEVEL MONITORING: ICD-10-CM

## 2023-05-16 DIAGNOSIS — D47.3 ESSENTIAL (HEMORRHAGIC) THROMBOCYTHEMIA: ICD-10-CM

## 2023-05-16 LAB
HCT VFR BLD CALC: 42 %
HGB BLD-MCNC: 14.8 G/DL
MCHC RBC-ENTMCNC: 31.4 PG
MCHC RBC-ENTMCNC: 35.2 G/DL
MCV RBC AUTO: 89.2 FL
PLATELET # BLD AUTO: 478 K/UL
PMV BLD: 9.1 FL
RBC # BLD: 4.71 M/UL
RBC # FLD: 12.7 %
WBC # FLD AUTO: 8.95 K/UL

## 2023-05-16 PROCEDURE — 80048 BASIC METABOLIC PNL TOTAL CA: CPT

## 2023-05-16 PROCEDURE — 99214 OFFICE O/P EST MOD 30 MIN: CPT

## 2023-05-16 PROCEDURE — 85027 COMPLETE CBC AUTOMATED: CPT

## 2023-05-16 PROCEDURE — 80076 HEPATIC FUNCTION PANEL: CPT

## 2023-05-16 PROCEDURE — 36415 COLL VENOUS BLD VENIPUNCTURE: CPT

## 2023-05-16 NOTE — CONSULT LETTER
[Dear  ___] : Dear  [unfilled], [Consult Letter:] : I had the pleasure of evaluating your patient, [unfilled]. [Please see my note below.] : Please see my note below. [Sincerely,] : Sincerely, [FreeTextEntry3] : Jasper Jon DO\par Attending Physician,\par Hematology/ Medical Oncology\par [phone] office\par \par  Alert and oriented to person, place and time

## 2023-05-16 NOTE — HISTORY OF PRESENT ILLNESS
[de-identified] : Mr. KELTON CUETO is a 62 year old male here today for evaluation and management of thrombocytosis.\par \par He was referred by PCP, Dr. Garrett.  KELTON is a 62 year old M with PMHx including HTN, hypercholesterolemia, YESSICA, asthma, GERD, thyroid nodule and anxiety, who presents to clinic to establish care.   He follows with [facility] and was noted to have elevated PLTs which were then confirmed by PCP at a later date.  He is presently feeling well with no new complaints.  Patient denies fever, chills, nausea, vomiting, dyspnea, acute changes in vision, increasing frequent/severity of headaches, skin rash, joint aches, unintentional weight loss or bleeding.  Patient reports family history of malignancy in his father (dx in his 40s - colon cancer) and sister (colon cancer - dx in her 50s).\par \par RADIOLOGIC WORKUP\par CXR (11.1.2019) IMPRESSION:  No radiographic evidence of acute pulmonary disease.\par \par LAB WORKUP\par (9.7.2022) WBC 10.38, Hgb 15.4, MCV 86.2, RDW 13.2, , ANC 6.83, Mono 0.95\par (8.11.2022) WBC 10.14, Hgb 16.9 MCV 87.2, RDW 13.5, , ANC 6.6, Lymph 2.24, Mono 0.9\par (7.31.2022) WBC 8.6, Hgb 16.4, \par \par HCM\par Colonoscopy done 2020 with Dr. Ayala reportedly benign \par COVID Vaccinated  [de-identified] : 11/29/22\par Patient is here for a follow-up visit for thrombocytosis.  He is feeling well with no new complaints.  Reviewed most recent CBC, which showed persistent thrombocytosis with PLTs 575,000.  Additional workup reveals JAK2 (+), establishing diagnosis of Essential Thrombocytosis.  Reviewed US Abd which shows heterogeneous echotexture of the liver.\par US Abd (10.28.2022) IMPRESSION:Heterogeneous echotexture of the liver.Bilateral nonobstructing nephrolithiasis.\par \par 12/13/22\par Patient is here for a follow-up visit for ET.  He is feeling well with no new complaints.  Reviewed most recent CBC, which shows persistent thrombocytosis with PLTs 600,000.  He initiated Hydrea 500mg once daily on 12.3.2022.  Patient endorses minor cramps, but they are not bothersome.  \par \par 1/11/23\par Patient is here for a follow-up visit for ET.  He is feeling well with no new complaints.  Reviewed most recent CBC, which shows PLTs 384,000.  He continues on Hydrea 500mg once daily, initiated on 12.3.2022.  Patient denies fever, chills, nausea, vomiting, GI upset, CP, palpitations, dyspnea, ulcers or bleeding.  \par \par 2/21/23\par Patient is here for a follow-up visit for ET.  He is feeling well with no new complaints.  Reviewed most recent CBC, which shows stable PLTs at 368,000.  He states he has been taking Hydrea 500mg twice daily.  Patient denies fever, chills, nausea, vomiting, GI upset, CP, palpitations, dyspnea, ulcers or bleeding.  He endorses more fatigue since being on Hydrea regimen.  \par \par 3/15/23\par Patient is here for a follow-up visit for ET.  He is feeling well with no new complaints.  Reviewed most recent CBC, which shows stable PLTs at 433,000.Hydrea was held due to concerns for gi symptoms and weakness caused by hydrea. .  Patient denies fever, chills, nausea, vomiting, GI upset, CP, palpitations, dyspnea, ulcers or bleeding.  \par \par 5/16/23\par

## 2023-05-16 NOTE — ASSESSMENT
[FreeTextEntry1] : # JAK2 (+) Essential Thrombocytosis \par - reviewed radiologic workup and labs report including implications of diagnosis and management using baby ASA + cytoreductive therapy \par - US Abd 10/2022 shows heterogeneous echotexture of the liver, suggestive of fatty liver \par - HOLD Hydrea 500mg due to fatigue/weakness as of 2/21/23 , initiated 12.3.2022 \par - c/w baby ASA daily , no refills needed \par \par # Family history of Colon Cancer \par - father diagnosed in his 40s and this is his cause of death \par - sister diagnosed in her 50s and in remission \par - briefly discussed role of genetic counseling due to diagnosis of malignancy at early age ; patient verbalized understanding and states his family is not interested at this time \par \par Encouraged to achieve healthy body weight through lifestyle modification which may include increased physical activity, as tolerated, and dietary modifications/portion control. \par \par RTC in 3 weeks with CBC, BMP, LFTs prior \par \par seen/ examined w/ fellow  note reviewed; case discussed\par 63 yo man with ECOG 1 was found to have thrombocytosis (platelets count is in 500K). \par JAK2 positive myeloproliferative neoplasm/ essential thrombocytosis, \par in view of the age (>59 yo) and JAK2 status, pt has a  high risk \par - explained that pt has fatty liver among other comorbidities which could contribute to elevated platelets count\par - he has no anemia and no splenomegaly; he has no B symptoms\par - explained that this entity is characterized as a neoplasm; however, the prognosis should be encouraging\par - to prevent arterial thrombosis, continue ASA 81MG\par - to decrease the risk of venous thrombosis, initiatated daily  hydrea 500mg 12/3/22;\par - in view of GI symptoms, hydrea was held for 3 weeks to evaluate the etiology; the symptoms improved; platelets count mildly increased to >400K.\par \par  On 03/15/2023: Recommend to restart hydrea at the schedule 1 tab every 2 days and reevaluate in a few weeks; \par \par 5/16/23: RECOMMEND TO decrease 1 tab every 3 days and reevaluate in 4 weeks\par - explained that if left untreated and in those patients who have an advanced presentation, this entity could lead to acute leukemia, myelofibrosis, among other condidtions; however, he is far from this presentation\par . \par \par \par \par \par \par

## 2023-05-17 LAB
ALBUMIN SERPL ELPH-MCNC: 4.8 G/DL
ALP BLD-CCNC: 86 U/L
ALT SERPL-CCNC: 34 U/L
ANION GAP SERPL CALC-SCNC: 10 MMOL/L
AST SERPL-CCNC: 27 U/L
BILIRUB DIRECT SERPL-MCNC: <0.2 MG/DL
BILIRUB INDIRECT SERPL-MCNC: >0.4 MG/DL
BILIRUB SERPL-MCNC: 0.6 MG/DL
BUN SERPL-MCNC: 14 MG/DL
CALCIUM SERPL-MCNC: 10 MG/DL
CHLORIDE SERPL-SCNC: 102 MMOL/L
CO2 SERPL-SCNC: 24 MMOL/L
CREAT SERPL-MCNC: 0.9 MG/DL
EGFR: 96 ML/MIN/1.73M2
GLUCOSE SERPL-MCNC: 94 MG/DL
POTASSIUM SERPL-SCNC: 5.1 MMOL/L
PROT SERPL-MCNC: 7.4 G/DL
SODIUM SERPL-SCNC: 136 MMOL/L

## 2023-06-07 ENCOUNTER — LABORATORY RESULT (OUTPATIENT)
Age: 63
End: 2023-06-07

## 2023-06-07 ENCOUNTER — OUTPATIENT (OUTPATIENT)
Dept: OUTPATIENT SERVICES | Facility: HOSPITAL | Age: 63
LOS: 1 days | End: 2023-06-07
Payer: COMMERCIAL

## 2023-06-07 ENCOUNTER — APPOINTMENT (OUTPATIENT)
Dept: HEMATOLOGY ONCOLOGY | Facility: CLINIC | Age: 63
End: 2023-06-07
Payer: COMMERCIAL

## 2023-06-07 VITALS
HEIGHT: 66 IN | BODY MASS INDEX: 32.14 KG/M2 | HEART RATE: 82 BPM | DIASTOLIC BLOOD PRESSURE: 77 MMHG | RESPIRATION RATE: 16 BRPM | TEMPERATURE: 98 F | SYSTOLIC BLOOD PRESSURE: 146 MMHG | WEIGHT: 200 LBS

## 2023-06-07 DIAGNOSIS — Z51.81 ENCOUNTER FOR THERAPEUTIC DRUG LEVEL MONITORING: ICD-10-CM

## 2023-06-07 DIAGNOSIS — D75.839 THROMBOCYTOSIS, UNSPECIFIED: ICD-10-CM

## 2023-06-07 DIAGNOSIS — D47.3 ESSENTIAL (HEMORRHAGIC) THROMBOCYTHEMIA: ICD-10-CM

## 2023-06-07 LAB
HCT VFR BLD CALC: 42.2 %
HGB BLD-MCNC: 14.8 G/DL
MCHC RBC-ENTMCNC: 30.6 PG
MCHC RBC-ENTMCNC: 35.1 G/DL
MCV RBC AUTO: 87.2 FL
PLATELET # BLD AUTO: 391 K/UL
PMV BLD: 10.6 FL
RBC # BLD: 4.84 M/UL
RBC # FLD: 12.7 %
WBC # FLD AUTO: 10.06 K/UL

## 2023-06-07 PROCEDURE — 99214 OFFICE O/P EST MOD 30 MIN: CPT

## 2023-06-07 PROCEDURE — 85027 COMPLETE CBC AUTOMATED: CPT

## 2023-06-07 NOTE — ASSESSMENT
[FreeTextEntry1] : # JAK2 (+) Essential Thrombocytosis \par - reviewed radiologic workup and labs report including implications of diagnosis and management using baby ASA + cytoreductive therapy \par - US Abd 10/2022 shows heterogeneous echotexture of the liver, suggestive of fatty liver \par - HOLD Hydrea 500mg due to fatigue/weakness as of 2/21/23 , initiated 12.3.2022 \par - c/w baby ASA daily , no refills needed \par \par # Family history of Colon Cancer \par - father diagnosed in his 40s and this is his cause of death \par - sister diagnosed in her 50s and in remission \par - briefly discussed role of genetic counseling due to diagnosis of malignancy at early age ; patient verbalized understanding and states his family is not interested at this time \par \par Encouraged to achieve healthy body weight through lifestyle modification which may include increased physical activity, as tolerated, and dietary modifications/portion control. \par \par RTC in 3 weeks with CBC, BMP, LFTs prior \par \par seen/ examined w/ fellow  note reviewed; case discussed\par 61 yo man with ECOG 1 was found to have thrombocytosis (platelets count is in 500K). \par JAK2 positive myeloproliferative neoplasm/ essential thrombocytosis, \par in view of the age (>61 yo) and JAK2 status, pt has a  high risk \par - explained that pt has fatty liver among other comorbidities which could contribute to elevated platelets count\par - he has no anemia and no splenomegaly; he has no B symptoms\par - explained that this entity is characterized as a neoplasm; however, the prognosis should be encouraging\par - to prevent arterial thrombosis, continue ASA 81MG\par - to decrease the risk of venous thrombosis, initiatated daily  hydrea 500mg 12/3/22;\par - in view of GI symptoms, hydrea was held for 3 weeks to evaluate the etiology; the symptoms improved; platelets count mildly increased to >400K.\par \par  On 03/15/2023: Recommend to restart hydrea at the schedule 1 tab every 2 days and reevaluate in a few weeks; \par \par 5/16/23: RECOMMEND TO decrease 1 tab every 3 days and reevaluate in 4 weeks\par - explained that if left untreated and in those patients who have an advanced presentation, this entity could lead to acute leukemia, myelofibrosis, among other condidtions; however, he is far from this presentation.\par \par 6/7/23 on Hydrea 500 mg one tablet every 3 days. CBC reviewed platelet count remained stable. c/w taking Hydrea one tablet every 3 days. symptomos improved - but with addition of anti nausea and anti diarrhea meds, prescribed by his PMD\par \par RTC in 3 months.\par \par \par \par \par \par

## 2023-06-07 NOTE — HISTORY OF PRESENT ILLNESS
[de-identified] : Mr. KELTON CUETO is a 62 year old male here today for evaluation and management of thrombocytosis.\par \par He was referred by PCP, Dr. Garrett.  KELTON is a 62 year old M with PMHx including HTN, hypercholesterolemia, YESSICA, asthma, GERD, thyroid nodule and anxiety, who presents to clinic to establish care.   He follows with [facility] and was noted to have elevated PLTs which were then confirmed by PCP at a later date.  He is presently feeling well with no new complaints.  Patient denies fever, chills, nausea, vomiting, dyspnea, acute changes in vision, increasing frequent/severity of headaches, skin rash, joint aches, unintentional weight loss or bleeding.  Patient reports family history of malignancy in his father (dx in his 40s - colon cancer) and sister (colon cancer - dx in her 50s).\par \par RADIOLOGIC WORKUP\par CXR (11.1.2019) IMPRESSION:  No radiographic evidence of acute pulmonary disease.\par \par LAB WORKUP\par (9.7.2022) WBC 10.38, Hgb 15.4, MCV 86.2, RDW 13.2, , ANC 6.83, Mono 0.95\par (8.11.2022) WBC 10.14, Hgb 16.9 MCV 87.2, RDW 13.5, , ANC 6.6, Lymph 2.24, Mono 0.9\par (7.31.2022) WBC 8.6, Hgb 16.4, \par \par HCM\par Colonoscopy done 2020 with Dr. Ayala reportedly benign \par COVID Vaccinated  [de-identified] : 11/29/22\par Patient is here for a follow-up visit for thrombocytosis.  He is feeling well with no new complaints.  Reviewed most recent CBC, which showed persistent thrombocytosis with PLTs 575,000.  Additional workup reveals JAK2 (+), establishing diagnosis of Essential Thrombocytosis.  Reviewed US Abd which shows heterogeneous echotexture of the liver.\par US Abd (10.28.2022) IMPRESSION:Heterogeneous echotexture of the liver.Bilateral nonobstructing nephrolithiasis.\par \par 12/13/22\par Patient is here for a follow-up visit for ET.  He is feeling well with no new complaints.  Reviewed most recent CBC, which shows persistent thrombocytosis with PLTs 600,000.  He initiated Hydrea 500mg once daily on 12.3.2022.  Patient endorses minor cramps, but they are not bothersome.  \par \par 1/11/23\par Patient is here for a follow-up visit for ET.  He is feeling well with no new complaints.  Reviewed most recent CBC, which shows PLTs 384,000.  He continues on Hydrea 500mg once daily, initiated on 12.3.2022.  Patient denies fever, chills, nausea, vomiting, GI upset, CP, palpitations, dyspnea, ulcers or bleeding.  \par \par 2/21/23\par Patient is here for a follow-up visit for ET.  He is feeling well with no new complaints.  Reviewed most recent CBC, which shows stable PLTs at 368,000.  He states he has been taking Hydrea 500mg twice daily.  Patient denies fever, chills, nausea, vomiting, GI upset, CP, palpitations, dyspnea, ulcers or bleeding.  He endorses more fatigue since being on Hydrea regimen.  \par \par 3/15/23\par Patient is here for a follow-up visit for ET.  He is feeling well with no new complaints.  Reviewed most recent CBC, which shows stable PLTs at 433,000.Hydrea was held due to concerns for gi symptoms and weakness caused by hydrea. .  Patient denies fever, chills, nausea, vomiting, GI upset, CP, palpitations, dyspnea, ulcers or bleeding.  \par \par 5/16/23\par Patient is here for a follow-up visit for ET.  He is feeling well with no new complaints. Decreased the dose of hydrea to 500 mg every 3 days due to Gi issues. He mentioned PCP started him on antidiarrhea and antinausea medication and they helped. No active complaints. No vasomotor symptoms. \par

## 2023-06-07 NOTE — CONSULT LETTER
[Dear  ___] : Dear  [unfilled], [Consult Letter:] : I had the pleasure of evaluating your patient, [unfilled]. [Please see my note below.] : Please see my note below. [Sincerely,] : Sincerely, [FreeTextEntry3] : Jasper Jon DO\par Attending Physician,\par Hematology/ Medical Oncology\par [phone] office\par \par

## 2023-06-12 ENCOUNTER — RX RENEWAL (OUTPATIENT)
Age: 63
End: 2023-06-12

## 2023-08-16 ENCOUNTER — RX RENEWAL (OUTPATIENT)
Age: 63
End: 2023-08-16

## 2023-09-20 ENCOUNTER — LABORATORY RESULT (OUTPATIENT)
Age: 63
End: 2023-09-20

## 2023-09-20 ENCOUNTER — APPOINTMENT (OUTPATIENT)
Dept: HEMATOLOGY ONCOLOGY | Facility: CLINIC | Age: 63
End: 2023-09-20
Payer: COMMERCIAL

## 2023-09-20 ENCOUNTER — OUTPATIENT (OUTPATIENT)
Dept: OUTPATIENT SERVICES | Facility: HOSPITAL | Age: 63
LOS: 1 days | End: 2023-09-20
Payer: COMMERCIAL

## 2023-09-20 VITALS
SYSTOLIC BLOOD PRESSURE: 154 MMHG | TEMPERATURE: 98.2 F | HEIGHT: 66 IN | HEART RATE: 84 BPM | BODY MASS INDEX: 31.98 KG/M2 | DIASTOLIC BLOOD PRESSURE: 88 MMHG | RESPIRATION RATE: 16 BRPM | WEIGHT: 199 LBS

## 2023-09-20 DIAGNOSIS — D47.3 ESSENTIAL (HEMORRHAGIC) THROMBOCYTHEMIA: ICD-10-CM

## 2023-09-20 DIAGNOSIS — D75.839 THROMBOCYTOSIS, UNSPECIFIED: ICD-10-CM

## 2023-09-20 DIAGNOSIS — Z51.81 ENCOUNTER FOR THERAPEUTIC DRUG LEVEL MONITORING: ICD-10-CM

## 2023-09-20 LAB
HCT VFR BLD CALC: 43.2 %
HGB BLD-MCNC: 14.9 G/DL
MCHC RBC-ENTMCNC: 29.3 PG
MCHC RBC-ENTMCNC: 34.5 G/DL
MCV RBC AUTO: 84.9 FL
PLATELET # BLD AUTO: 206 K/UL
PMV BLD: 10.9 FL
RBC # BLD: 5.09 M/UL
RBC # FLD: 12.7 %
WBC # FLD AUTO: 10.13 K/UL

## 2023-09-20 PROCEDURE — 99214 OFFICE O/P EST MOD 30 MIN: CPT

## 2023-09-20 PROCEDURE — 85027 COMPLETE CBC AUTOMATED: CPT

## 2023-10-13 ENCOUNTER — APPOINTMENT (OUTPATIENT)
Dept: HEMATOLOGY ONCOLOGY | Facility: CLINIC | Age: 63
End: 2023-10-13

## 2023-10-17 ENCOUNTER — OUTPATIENT (OUTPATIENT)
Dept: OUTPATIENT SERVICES | Facility: HOSPITAL | Age: 63
LOS: 1 days | End: 2023-10-17
Payer: COMMERCIAL

## 2023-10-17 ENCOUNTER — LABORATORY RESULT (OUTPATIENT)
Age: 63
End: 2023-10-17

## 2023-10-17 ENCOUNTER — APPOINTMENT (OUTPATIENT)
Dept: HEMATOLOGY ONCOLOGY | Facility: CLINIC | Age: 63
End: 2023-10-17

## 2023-10-17 DIAGNOSIS — D47.3 ESSENTIAL (HEMORRHAGIC) THROMBOCYTHEMIA: ICD-10-CM

## 2023-10-17 DIAGNOSIS — D75.839 THROMBOCYTOSIS, UNSPECIFIED: ICD-10-CM

## 2023-10-17 DIAGNOSIS — Z51.81 ENCOUNTER FOR THERAPEUTIC DRUG LEVEL MONITORING: ICD-10-CM

## 2023-10-17 LAB
HCT VFR BLD CALC: 45.2 %
HGB BLD-MCNC: 15.3 G/DL
MCHC RBC-ENTMCNC: 29.3 PG
MCHC RBC-ENTMCNC: 33.8 G/DL
MCV RBC AUTO: 86.4 FL
PLATELET # BLD AUTO: 548 K/UL
PMV BLD: 9.7 FL
RBC # BLD: 5.23 M/UL
RBC # FLD: 12.6 %
WBC # FLD AUTO: 10.53 K/UL

## 2023-10-17 PROCEDURE — 36415 COLL VENOUS BLD VENIPUNCTURE: CPT

## 2023-10-17 PROCEDURE — 85027 COMPLETE CBC AUTOMATED: CPT

## 2023-10-17 PROCEDURE — 80076 HEPATIC FUNCTION PANEL: CPT

## 2023-10-17 PROCEDURE — 80048 BASIC METABOLIC PNL TOTAL CA: CPT

## 2023-10-18 LAB
ALBUMIN SERPL ELPH-MCNC: 4.6 G/DL
ALP BLD-CCNC: 101 U/L
ALT SERPL-CCNC: 41 U/L
ANION GAP SERPL CALC-SCNC: 13 MMOL/L
AST SERPL-CCNC: 27 U/L
BILIRUB DIRECT SERPL-MCNC: <0.2 MG/DL
BILIRUB INDIRECT SERPL-MCNC: >0.2 MG/DL
BILIRUB SERPL-MCNC: 0.4 MG/DL
BUN SERPL-MCNC: 19 MG/DL
CALCIUM SERPL-MCNC: 9.9 MG/DL
CHLORIDE SERPL-SCNC: 100 MMOL/L
CO2 SERPL-SCNC: 25 MMOL/L
CREAT SERPL-MCNC: 1 MG/DL
EGFR: 85 ML/MIN/1.73M2
GLUCOSE SERPL-MCNC: 93 MG/DL
POTASSIUM SERPL-SCNC: 5.3 MMOL/L
PROT SERPL-MCNC: 7.3 G/DL
SODIUM SERPL-SCNC: 138 MMOL/L

## 2023-11-01 ENCOUNTER — LABORATORY RESULT (OUTPATIENT)
Age: 63
End: 2023-11-01

## 2023-11-01 ENCOUNTER — APPOINTMENT (OUTPATIENT)
Dept: HEMATOLOGY ONCOLOGY | Facility: CLINIC | Age: 63
End: 2023-11-01
Payer: COMMERCIAL

## 2023-11-01 ENCOUNTER — OUTPATIENT (OUTPATIENT)
Dept: OUTPATIENT SERVICES | Facility: HOSPITAL | Age: 63
LOS: 1 days | End: 2023-11-01
Payer: COMMERCIAL

## 2023-11-01 VITALS
HEIGHT: 66 IN | WEIGHT: 197 LBS | SYSTOLIC BLOOD PRESSURE: 154 MMHG | RESPIRATION RATE: 16 BRPM | TEMPERATURE: 97.9 F | DIASTOLIC BLOOD PRESSURE: 86 MMHG | HEART RATE: 84 BPM | BODY MASS INDEX: 31.66 KG/M2

## 2023-11-01 DIAGNOSIS — Z51.81 ENCOUNTER FOR THERAPEUTIC DRUG LEVEL MONITORING: ICD-10-CM

## 2023-11-01 DIAGNOSIS — D75.839 THROMBOCYTOSIS, UNSPECIFIED: ICD-10-CM

## 2023-11-01 DIAGNOSIS — D47.3 ESSENTIAL (HEMORRHAGIC) THROMBOCYTHEMIA: ICD-10-CM

## 2023-11-01 LAB
HCT VFR BLD CALC: 43 %
HGB BLD-MCNC: 15.2 G/DL
MCHC RBC-ENTMCNC: 29.8 PG
MCHC RBC-ENTMCNC: 35.3 G/DL
MCV RBC AUTO: 84.3 FL
PLATELET # BLD AUTO: 402 K/UL
PMV BLD: 10.4 FL
RBC # BLD: 5.1 M/UL
RBC # FLD: 12.8 %
WBC # FLD AUTO: 10.4 K/UL

## 2023-11-01 PROCEDURE — 99214 OFFICE O/P EST MOD 30 MIN: CPT

## 2023-11-01 PROCEDURE — 85027 COMPLETE CBC AUTOMATED: CPT

## 2023-12-27 ENCOUNTER — APPOINTMENT (OUTPATIENT)
Dept: HEMATOLOGY ONCOLOGY | Facility: CLINIC | Age: 63
End: 2023-12-27
Payer: COMMERCIAL

## 2023-12-27 ENCOUNTER — OUTPATIENT (OUTPATIENT)
Dept: OUTPATIENT SERVICES | Facility: HOSPITAL | Age: 63
LOS: 1 days | End: 2023-12-27
Payer: COMMERCIAL

## 2023-12-27 ENCOUNTER — LABORATORY RESULT (OUTPATIENT)
Age: 63
End: 2023-12-27

## 2023-12-27 VITALS
DIASTOLIC BLOOD PRESSURE: 87 MMHG | WEIGHT: 218 LBS | SYSTOLIC BLOOD PRESSURE: 149 MMHG | HEIGHT: 66 IN | TEMPERATURE: 97.9 F | BODY MASS INDEX: 35.03 KG/M2 | HEART RATE: 88 BPM | RESPIRATION RATE: 16 BRPM

## 2023-12-27 DIAGNOSIS — D47.3 ESSENTIAL (HEMORRHAGIC) THROMBOCYTHEMIA: ICD-10-CM

## 2023-12-27 DIAGNOSIS — Z51.81 ENCOUNTER FOR THERAPEUTIC DRUG LEVEL MONITORING: ICD-10-CM

## 2023-12-27 DIAGNOSIS — D75.839 THROMBOCYTOSIS, UNSPECIFIED: ICD-10-CM

## 2023-12-27 LAB
HCT VFR BLD CALC: 43.1 %
HGB BLD-MCNC: 14.9 G/DL
MCHC RBC-ENTMCNC: 29.2 PG
MCHC RBC-ENTMCNC: 34.6 G/DL
MCV RBC AUTO: 84.5 FL
PLATELET # BLD AUTO: 508 K/UL
PMV BLD: 9.3 FL
RBC # BLD: 5.1 M/UL
RBC # FLD: 13.2 %
WBC # FLD AUTO: 10.6 K/UL

## 2023-12-27 PROCEDURE — 80076 HEPATIC FUNCTION PANEL: CPT

## 2023-12-27 PROCEDURE — 85027 COMPLETE CBC AUTOMATED: CPT

## 2023-12-27 PROCEDURE — 99214 OFFICE O/P EST MOD 30 MIN: CPT

## 2023-12-27 PROCEDURE — 80048 BASIC METABOLIC PNL TOTAL CA: CPT

## 2023-12-27 NOTE — REVIEW OF SYSTEMS
[Recent Change In Weight] : ~T recent weight change [Diarrhea: Grade 0] : Diarrhea: Grade 0 [Negative] : Allergic/Immunologic [FreeTextEntry2] : gaining weight

## 2023-12-27 NOTE — HISTORY OF PRESENT ILLNESS
[de-identified] : Mr. KELTON CUETO is a 62 year old male here today for evaluation and management of thrombocytosis.  He was referred by PCP, Dr. Garrett. KELTON is a 62 year old M with PMHx including HTN, hypercholesterolemia, YESSICA, asthma, GERD, thyroid nodule and anxiety, who presents to clinic to establish care. He follows with [facility] and was noted to have elevated PLTs which were then confirmed by PCP at a later date. He is presently feeling well with no new complaints. Patient denies fever, chills, nausea, vomiting, dyspnea, acute changes in vision, increasing frequent/severity of headaches, skin rash, joint aches, unintentional weight loss or bleeding. Patient reports family history of malignancy in his father (dx in his 40s - colon cancer) and sister (colon cancer - dx in her 50s).  RADIOLOGIC WORKUP CXR (11.1.2019) IMPRESSION: No radiographic evidence of acute pulmonary disease.  LAB WORKUP (9.7.2022) WBC 10.38, Hgb 15.4, MCV 86.2, RDW 13.2, , ANC 6.83, Mono 0.95 (8.11.2022) WBC 10.14, Hgb 16.9 MCV 87.2, RDW 13.5, , ANC 6.6, Lymph 2.24, Mono 0.9 (7.31.2022) WBC 8.6, Hgb 16.4,   HCM Colonoscopy done 2020 with Dr. Ayala reportedly benign COVID Vaccinated. [de-identified] : 11/29/22 Patient is here for a follow-up visit for thrombocytosis. He is feeling well with no new complaints. Reviewed most recent CBC, which showed persistent thrombocytosis with PLTs 575,000. Additional workup reveals JAK2 (+), establishing diagnosis of Essential Thrombocytosis. Reviewed US Abd which shows heterogeneous echotexture of the liver. US Abd (10.28.2022) IMPRESSION:Heterogeneous echotexture of the liver.Bilateral nonobstructing nephrolithiasis.  12/13/22 Patient is here for a follow-up visit for ET. He is feeling well with no new complaints. Reviewed most recent CBC, which shows persistent thrombocytosis with PLTs 600,000. He initiated Hydrea 500mg once daily on 12.3.2022. Patient endorses minor cramps, but they are not bothersome.  1/11/23 Patient is here for a follow-up visit for ET. He is feeling well with no new complaints. Reviewed most recent CBC, which shows PLTs 384,000. He continues on Hydrea 500mg once daily, initiated on 12.3.2022. Patient denies fever, chills, nausea, vomiting, GI upset, CP, palpitations, dyspnea, ulcers or bleeding.  2/21/23 Patient is here for a follow-up visit for ET. He is feeling well with no new complaints. Reviewed most recent CBC, which shows stable PLTs at 368,000. He states he has been taking Hydrea 500mg twice daily. Patient denies fever, chills, nausea, vomiting, GI upset, CP, palpitations, dyspnea, ulcers or bleeding. He endorses more fatigue since being on Hydrea regimen.  3/15/23 Patient is here for a follow-up visit for ET. He is feeling well with no new complaints. Reviewed most recent CBC, which shows stable PLTs at 433,000.Hydrea was held due to concerns for gi symptoms and weakness caused by hydrea.. Patient denies fever, chills, nausea, vomiting, GI upset, CP, palpitations, dyspnea, ulcers or bleeding.  5/16/23 Patient is here for a follow-up visit for ET. He is feeling well with no new complaints. Decreased the dose of hydrea to 500 mg every 3 days due to Gi issues. He mentioned PCP started him on antidiarrhea and antinausea medication and they helped. No active complaints. No vasomotor symptoms.  9/20/23 Patient is here for a follow-up visit for JAK2 (+) ET. He is feeling well with no new complaints. He has been taking hydrea 500mg once every 3 days due to GI issues. He mentioned PCP started him on antidiarrhea and antinausea medication and they helped. Reviewed most recent CBC which shows WBC 10.13, Hgb 14.9 and PLTs 206,000. He is still experiencing some nausea/diarrhea occasionally from the Hydrea. He had colonoscopy and upper EGD about 3 months ago which was reportedly normal.   11/1/2023 Patient is here for follow up visit for Jak2+ ET. He is overall feeling well with no new complaints to offer at this time. He is taking Hydrea 500 mg every 3 days. He is tolerating it well beside from mild nausea on the days he is taking the hydrea. He is treating this nausea with zofran which helps control his symptoms.   12/27/23 Patient is here for follow up visit for Jak2+ ET.  He is overall feeling well with no new complaints to offer at this time.  He continues on baby ASA daily.  He is taking Hydrea 500 mg every 3 days.  Reviewed most recent CBC which shows rise in Hydrea up to 508,000.  He is tolerating Hydrea well beside fatigue and mild nausea on the days he is taking the hydroxyurea. Denies chest pain, palpitations, worsening frequency/severity of headaches or overt bleeding.

## 2023-12-27 NOTE — ASSESSMENT
[FreeTextEntry1] : 62 yo man with ECOG 1 was found to have thrombocytosis (platelets count is in 500K). JAK2 positive myeloproliferative neoplasm/ essential thrombocytosis, in view of the age (>59 yo) and JAK2 status, pt has a high risk.   # JAK2 (+) Essential Thrombocytosis - reviewed radiologic workup and labs report including implications of diagnosis and management using baby ASA + cytoreductive therapy - US Abd 10/2022 shows heterogeneous echotexture of the liver, suggestive of fatty liver - experiences treatment related side effects which improve with addition of anti nausea and anti diarrhea meds, prescribed by his PMD - c/w baby ASA daily, no refills needed - c/w Hydrea 500 mg of hydrea every 3 days, Rx refilled ; will consider dose increase if worsening of thrombocytosis  # Family history of Colon Cancer - father diagnosed in his 40s and this is his cause of death - sister diagnosed in her 50s and in remission - briefly discussed role of genetic counseling due to diagnosis of malignancy at early age ; patient verbalized understanding and states his family is not interested at this time - s/p colonoscopy + upper endoscopy in 2022  Labwork in 3 weeks: CBC RTC in 6 weeks with CBC seen examined w/ NP Ruth; note reviwed; case discussed; agree w/plan

## 2023-12-27 NOTE — CONSULT LETTER
[Dear  ___] : Dear  [unfilled], [Consult Letter:] : I had the pleasure of evaluating your patient, [unfilled]. [Please see my note below.] : Please see my note below. [Sincerely,] : Sincerely, [FreeTextEntry3] : Jasper Jon DO Attending Physician, Hematology/ Medical Oncology [phone] office

## 2023-12-28 LAB
ALBUMIN SERPL ELPH-MCNC: 4.8 G/DL
ALP BLD-CCNC: 91 U/L
ALT SERPL-CCNC: 33 U/L
ANION GAP SERPL CALC-SCNC: 12 MMOL/L
AST SERPL-CCNC: 28 U/L
BILIRUB DIRECT SERPL-MCNC: 0.2 MG/DL
BILIRUB INDIRECT SERPL-MCNC: 0.3 MG/DL
BILIRUB SERPL-MCNC: 0.5 MG/DL
BUN SERPL-MCNC: 15 MG/DL
CALCIUM SERPL-MCNC: 10 MG/DL
CHLORIDE SERPL-SCNC: 101 MMOL/L
CO2 SERPL-SCNC: 25 MMOL/L
CREAT SERPL-MCNC: 1.1 MG/DL
EGFR: 75 ML/MIN/1.73M2
GLUCOSE SERPL-MCNC: 121 MG/DL
POTASSIUM SERPL-SCNC: 5.3 MMOL/L
PROT SERPL-MCNC: 7.5 G/DL
SODIUM SERPL-SCNC: 138 MMOL/L

## 2024-01-17 ENCOUNTER — APPOINTMENT (OUTPATIENT)
Dept: HEMATOLOGY ONCOLOGY | Facility: CLINIC | Age: 64
End: 2024-01-17

## 2024-01-30 ENCOUNTER — APPOINTMENT (OUTPATIENT)
Dept: HEMATOLOGY ONCOLOGY | Facility: CLINIC | Age: 64
End: 2024-01-30
Payer: COMMERCIAL

## 2024-01-30 ENCOUNTER — OUTPATIENT (OUTPATIENT)
Dept: OUTPATIENT SERVICES | Facility: HOSPITAL | Age: 64
LOS: 1 days | End: 2024-01-30
Payer: COMMERCIAL

## 2024-01-30 ENCOUNTER — LABORATORY RESULT (OUTPATIENT)
Age: 64
End: 2024-01-30

## 2024-01-30 VITALS
WEIGHT: 216 LBS | DIASTOLIC BLOOD PRESSURE: 80 MMHG | BODY MASS INDEX: 34.72 KG/M2 | SYSTOLIC BLOOD PRESSURE: 146 MMHG | TEMPERATURE: 97.6 F | HEART RATE: 79 BPM | HEIGHT: 66 IN

## 2024-01-30 DIAGNOSIS — D75.839 THROMBOCYTOSIS, UNSPECIFIED: ICD-10-CM

## 2024-01-30 DIAGNOSIS — D47.3 ESSENTIAL (HEMORRHAGIC) THROMBOCYTHEMIA: ICD-10-CM

## 2024-01-30 DIAGNOSIS — Z51.81 ENCOUNTER FOR THERAPEUTIC DRUG LEVEL MONITORING: ICD-10-CM

## 2024-01-30 LAB
HCT VFR BLD CALC: 43 %
HGB BLD-MCNC: 15.1 G/DL
MCHC RBC-ENTMCNC: 29.3 PG
MCHC RBC-ENTMCNC: 35.1 G/DL
MCV RBC AUTO: 83.3 FL
PLATELET # BLD AUTO: 249 K/UL
PMV BLD: 10.6 FL
RBC # BLD: 5.16 M/UL
RBC # FLD: 13.8 %
WBC # FLD AUTO: 9.77 K/UL

## 2024-01-30 PROCEDURE — 99214 OFFICE O/P EST MOD 30 MIN: CPT

## 2024-01-30 PROCEDURE — 85027 COMPLETE CBC AUTOMATED: CPT

## 2024-01-30 NOTE — CONSULT LETTER
[Dear  ___] : Dear  [unfilled], [Consult Letter:] : I had the pleasure of evaluating your patient, [unfilled]. [Please see my note below.] : Please see my note below. [Sincerely,] : Sincerely, [FreeTextEntry3] : Jasper Jon DO Attending Physician, Hematology/ Medical Oncology [phone] office

## 2024-01-30 NOTE — HISTORY OF PRESENT ILLNESS
[de-identified] : Mr. KELTON CUETO is a 62 year old male here today for evaluation and management of thrombocytosis.  He was referred by PCP, Dr. Garrett. KELTON is a 62 year old M with PMHx including HTN, hypercholesterolemia, YESSICA, asthma, GERD, thyroid nodule and anxiety, who presents to clinic to establish care. He follows with [facility] and was noted to have elevated PLTs which were then confirmed by PCP at a later date. He is presently feeling well with no new complaints. Patient denies fever, chills, nausea, vomiting, dyspnea, acute changes in vision, increasing frequent/severity of headaches, skin rash, joint aches, unintentional weight loss or bleeding. Patient reports family history of malignancy in his father (dx in his 40s - colon cancer) and sister (colon cancer - dx in her 50s).  RADIOLOGIC WORKUP CXR (11.1.2019) IMPRESSION: No radiographic evidence of acute pulmonary disease.  LAB WORKUP (9.7.2022) WBC 10.38, Hgb 15.4, MCV 86.2, RDW 13.2, , ANC 6.83, Mono 0.95 (8.11.2022) WBC 10.14, Hgb 16.9 MCV 87.2, RDW 13.5, , ANC 6.6, Lymph 2.24, Mono 0.9 (7.31.2022) WBC 8.6, Hgb 16.4,   HCM Colonoscopy done 2020 with Dr. Ayala reportedly benign COVID Vaccinated. [de-identified] : 11/29/22 Patient is here for a follow-up visit for thrombocytosis. He is feeling well with no new complaints. Reviewed most recent CBC, which showed persistent thrombocytosis with PLTs 575,000. Additional workup reveals JAK2 (+), establishing diagnosis of Essential Thrombocytosis. Reviewed US Abd which shows heterogeneous echotexture of the liver. US Abd (10.28.2022) IMPRESSION:Heterogeneous echotexture of the liver.Bilateral nonobstructing nephrolithiasis.  12/13/22 Patient is here for a follow-up visit for ET. He is feeling well with no new complaints. Reviewed most recent CBC, which shows persistent thrombocytosis with PLTs 600,000. He initiated Hydrea 500mg once daily on 12.3.2022. Patient endorses minor cramps, but they are not bothersome.  1/11/23 Patient is here for a follow-up visit for ET. He is feeling well with no new complaints. Reviewed most recent CBC, which shows PLTs 384,000. He continues on Hydrea 500mg once daily, initiated on 12.3.2022. Patient denies fever, chills, nausea, vomiting, GI upset, CP, palpitations, dyspnea, ulcers or bleeding.  2/21/23 Patient is here for a follow-up visit for ET. He is feeling well with no new complaints. Reviewed most recent CBC, which shows stable PLTs at 368,000. He states he has been taking Hydrea 500mg twice daily. Patient denies fever, chills, nausea, vomiting, GI upset, CP, palpitations, dyspnea, ulcers or bleeding. He endorses more fatigue since being on Hydrea regimen.  3/15/23 Patient is here for a follow-up visit for ET. He is feeling well with no new complaints. Reviewed most recent CBC, which shows stable PLTs at 433,000.Hydrea was held due to concerns for gi symptoms and weakness caused by hydrea.. Patient denies fever, chills, nausea, vomiting, GI upset, CP, palpitations, dyspnea, ulcers or bleeding.  5/16/23 Patient is here for a follow-up visit for ET. He is feeling well with no new complaints. Decreased the dose of hydrea to 500 mg every 3 days due to Gi issues. He mentioned PCP started him on antidiarrhea and antinausea medication and they helped. No active complaints. No vasomotor symptoms.  9/20/23 Patient is here for a follow-up visit for JAK2 (+) ET. He is feeling well with no new complaints. He has been taking hydrea 500mg once every 3 days due to GI issues. He mentioned PCP started him on antidiarrhea and antinausea medication and they helped. Reviewed most recent CBC which shows WBC 10.13, Hgb 14.9 and PLTs 206,000. He is still experiencing some nausea/diarrhea occasionally from the Hydrea. He had colonoscopy and upper EGD about 3 months ago which was reportedly normal.   11/1/2023 Patient is here for follow up visit for Jak2+ ET. He is overall feeling well with no new complaints to offer at this time. He is taking Hydrea 500 mg every 3 days. He is tolerating it well beside from mild nausea on the days he is taking the hydrea. He is treating this nausea with zofran which helps control his symptoms.   12/27/23 Patient is here for follow up visit for Jak2+ ET.  He is overall feeling well with no new complaints to offer at this time.  He continues on baby ASA daily.  He is taking Hydrea 500 mg every 3 days.  Reviewed most recent CBC which shows rise in Hydrea up to 508,000.  He is tolerating Hydrea well beside fatigue and mild nausea on the days he is taking the hydroxyurea. Denies chest pain, palpitations, worsening frequency/severity of headaches or overt bleeding.    1/30/24 Patient is here for follow up visit for Jak2+ ET.  He continues on baby ASA daily.  Since last visit, patient has been taking Hydrea 500mg every other day.  Reviewed most recent CBC which shows PLTs down to 249,000.  He is tolerating Hydrea but reports fatigue and mild nausea/diarrhea on the days he is taking the hydroxyurea.  Denies chest pain, palpitations, worsening frequency/severity of headaches or overt bleeding.

## 2024-01-30 NOTE — ASSESSMENT
[FreeTextEntry1] : 64 yo man with ECOG 1 was found to have thrombocytosis (platelets count is in 500K). JAK2 positive myeloproliferative neoplasm/ essential thrombocytosis, in view of the age (>59 yo) and JAK2 status, pt has a high risk.   # JAK2 (+) Essential Thrombocytosis - reviewed radiologic workup and labs report including implications of diagnosis and management using baby ASA + cytoreductive therapy - US Abd 10/2022 shows heterogeneous echotexture of the liver, suggestive of fatty liver - experiences treatment related side effects which improve with addition of anti nausea and anti diarrhea meds, prescribed by his PMD - c/w baby ASA daily, no refills needed - change Hydrea 500mg to every 3 days for better tolerability (develops nausea/diarrhea with pill)    # Family history of Colon Cancer - father diagnosed in his 40s and this is his cause of death - sister diagnosed in her 50s and in remission - briefly discussed role of genetic counseling due to diagnosis of malignancy at early age ; patient verbalized understanding and states his family is not interested at this time - s/p colonoscopy + upper endoscopy in 2022  RTC in 4 weeks with CBC  seen/ examined w/ NP Ruth; note reviewed; case discussed; agree w/ above:

## 2024-03-13 ENCOUNTER — APPOINTMENT (OUTPATIENT)
Dept: HEMATOLOGY ONCOLOGY | Facility: CLINIC | Age: 64
End: 2024-03-13
Payer: COMMERCIAL

## 2024-03-13 ENCOUNTER — OUTPATIENT (OUTPATIENT)
Dept: OUTPATIENT SERVICES | Facility: HOSPITAL | Age: 64
LOS: 1 days | End: 2024-03-13
Payer: COMMERCIAL

## 2024-03-13 VITALS
HEIGHT: 66 IN | DIASTOLIC BLOOD PRESSURE: 90 MMHG | WEIGHT: 213 LBS | BODY MASS INDEX: 34.23 KG/M2 | SYSTOLIC BLOOD PRESSURE: 136 MMHG | RESPIRATION RATE: 16 BRPM | HEART RATE: 64 BPM | TEMPERATURE: 98.6 F

## 2024-03-13 DIAGNOSIS — D47.3 ESSENTIAL (HEMORRHAGIC) THROMBOCYTHEMIA: ICD-10-CM

## 2024-03-13 LAB
BASOPHILS # BLD AUTO: 0.08 K/UL
BASOPHILS NFR BLD AUTO: 0.8 %
EOSINOPHIL # BLD AUTO: 0.22 K/UL
EOSINOPHIL NFR BLD AUTO: 2.2 %
HCT VFR BLD CALC: 43.2 %
HGB BLD-MCNC: 15 G/DL
IMM GRANULOCYTES NFR BLD AUTO: 0.9 %
LYMPHOCYTES # BLD AUTO: 1.93 K/UL
LYMPHOCYTES NFR BLD AUTO: 19.7 %
MAN DIFF?: NORMAL
MCHC RBC-ENTMCNC: 29.5 PG
MCHC RBC-ENTMCNC: 34.7 G/DL
MCV RBC AUTO: 85 FL
MONOCYTES # BLD AUTO: 0.91 K/UL
MONOCYTES NFR BLD AUTO: 9.3 %
NEUTROPHILS # BLD AUTO: 6.55 K/UL
NEUTROPHILS NFR BLD AUTO: 67.1 %
PLATELET # BLD AUTO: 479 K/UL
PMV BLD AUTO: 0 /100 WBCS
RBC # BLD: 5.08 M/UL
RBC # FLD: 13.4 %
WBC # FLD AUTO: 9.78 K/UL

## 2024-03-13 PROCEDURE — 99214 OFFICE O/P EST MOD 30 MIN: CPT

## 2024-03-13 PROCEDURE — 85027 COMPLETE CBC AUTOMATED: CPT

## 2024-03-13 NOTE — HISTORY OF PRESENT ILLNESS
[de-identified] : Mr. KELTON CUETO is a 62 year old male here today for evaluation and management of thrombocytosis.  He was referred by PCP, Dr. Garrett. KELTON is a 62 year old M with PMHx including HTN, hypercholesterolemia, YESSICA, asthma, GERD, thyroid nodule and anxiety, who presents to clinic to establish care. He follows with [facility] and was noted to have elevated PLTs which were then confirmed by PCP at a later date. He is presently feeling well with no new complaints. Patient denies fever, chills, nausea, vomiting, dyspnea, acute changes in vision, increasing frequent/severity of headaches, skin rash, joint aches, unintentional weight loss or bleeding. Patient reports family history of malignancy in his father (dx in his 40s - colon cancer) and sister (colon cancer - dx in her 50s).  RADIOLOGIC WORKUP CXR (11.1.2019) IMPRESSION: No radiographic evidence of acute pulmonary disease.  LAB WORKUP (9.7.2022) WBC 10.38, Hgb 15.4, MCV 86.2, RDW 13.2, , ANC 6.83, Mono 0.95 (8.11.2022) WBC 10.14, Hgb 16.9 MCV 87.2, RDW 13.5, , ANC 6.6, Lymph 2.24, Mono 0.9 (7.31.2022) WBC 8.6, Hgb 16.4,   HCM Colonoscopy done 2020 with Dr. Ayala reportedly benign COVID Vaccinated. [de-identified] : 11/29/22 Patient is here for a follow-up visit for thrombocytosis. He is feeling well with no new complaints. Reviewed most recent CBC, which showed persistent thrombocytosis with PLTs 575,000. Additional workup reveals JAK2 (+), establishing diagnosis of Essential Thrombocytosis. Reviewed US Abd which shows heterogeneous echotexture of the liver. US Abd (10.28.2022) IMPRESSION:Heterogeneous echotexture of the liver.Bilateral nonobstructing nephrolithiasis.  12/13/22 Patient is here for a follow-up visit for ET. He is feeling well with no new complaints. Reviewed most recent CBC, which shows persistent thrombocytosis with PLTs 600,000. He initiated Hydrea 500mg once daily on 12.3.2022. Patient endorses minor cramps, but they are not bothersome.  1/11/23 Patient is here for a follow-up visit for ET. He is feeling well with no new complaints. Reviewed most recent CBC, which shows PLTs 384,000. He continues on Hydrea 500mg once daily, initiated on 12.3.2022. Patient denies fever, chills, nausea, vomiting, GI upset, CP, palpitations, dyspnea, ulcers or bleeding.  2/21/23 Patient is here for a follow-up visit for ET. He is feeling well with no new complaints. Reviewed most recent CBC, which shows stable PLTs at 368,000. He states he has been taking Hydrea 500mg twice daily. Patient denies fever, chills, nausea, vomiting, GI upset, CP, palpitations, dyspnea, ulcers or bleeding. He endorses more fatigue since being on Hydrea regimen.  3/15/23 Patient is here for a follow-up visit for ET. He is feeling well with no new complaints. Reviewed most recent CBC, which shows stable PLTs at 433,000.Hydrea was held due to concerns for gi symptoms and weakness caused by hydrea.. Patient denies fever, chills, nausea, vomiting, GI upset, CP, palpitations, dyspnea, ulcers or bleeding.  5/16/23 Patient is here for a follow-up visit for ET. He is feeling well with no new complaints. Decreased the dose of hydrea to 500 mg every 3 days due to Gi issues. He mentioned PCP started him on antidiarrhea and antinausea medication and they helped. No active complaints. No vasomotor symptoms.  9/20/23 Patient is here for a follow-up visit for JAK2 (+) ET. He is feeling well with no new complaints. He has been taking hydrea 500mg once every 3 days due to GI issues. He mentioned PCP started him on antidiarrhea and antinausea medication and they helped. Reviewed most recent CBC which shows WBC 10.13, Hgb 14.9 and PLTs 206,000. He is still experiencing some nausea/diarrhea occasionally from the Hydrea. He had colonoscopy and upper EGD about 3 months ago which was reportedly normal.   11/1/2023 Patient is here for follow up visit for Jak2+ ET. He is overall feeling well with no new complaints to offer at this time. He is taking Hydrea 500 mg every 3 days. He is tolerating it well beside from mild nausea on the days he is taking the hydrea. He is treating this nausea with zofran which helps control his symptoms.   12/27/23 Patient is here for follow up visit for Jak2+ ET.  He is overall feeling well with no new complaints to offer at this time.  He continues on baby ASA daily.  He is taking Hydrea 500 mg every 3 days.  Reviewed most recent CBC which shows rise in Hydrea up to 508,000.  He is tolerating Hydrea well beside fatigue and mild nausea on the days he is taking the hydroxyurea. Denies chest pain, palpitations, worsening frequency/severity of headaches or overt bleeding.    1/30/24 Patient is here for follow up visit for Jak2+ ET.  He continues on baby ASA daily.  Since last visit, patient has been taking Hydrea 500mg every other day.  Reviewed most recent CBC which shows PLTs down to 249,000.  He is tolerating Hydrea but reports fatigue and mild nausea/diarrhea on the days he is taking the hydroxyurea.  Denies chest pain, palpitations, worsening frequency/severity of headaches or overt bleeding.

## 2024-03-13 NOTE — ASSESSMENT
[FreeTextEntry1] : 64 yo man with ECOG 1 was found to have thrombocytosis (platelets count is in 500K). JAK2 positive myeloproliferative neoplasm/ essential thrombocytosis, in view of the age (>61 yo) and JAK2 status, pt has a high risk.   # JAK2 (+) Essential Thrombocytosis - reviewed radiologic workup and labs report including implications of diagnosis and management using baby ASA + cytoreductive therapy - US Abd 10/2022 shows heterogeneous echotexture of the liver, suggestive of fatty liver - experiences treatment related side effects which improve with addition of anti nausea and anti diarrhea meds, prescribed by his PMD - c/w baby ASA daily, no refills needed - change Hydrea 500mg to every 3 days for better tolerability (develops nausea/diarrhea with pill)    # Family history of Colon Cancer - father diagnosed in his 40s and this is his cause of death - sister diagnosed in her 50s and in remission - briefly discussed role of genetic counseling due to diagnosis of malignancy at early age ; patient verbalized understanding and states his family is not interested at this time - s/p colonoscopy + upper endoscopy in 2022  labs today and will call withthe result rtc in one month to see ACP  ADDENDUM: platelets 479 k (mildly elevated, increased compared to the last visit; recommend to repeat in one week, if persistently elevated, will adjust the dose

## 2024-03-13 NOTE — CONSULT LETTER
[Consult Letter:] : I had the pleasure of evaluating your patient, [unfilled]. [Dear  ___] : Dear  [unfilled], [Please see my note below.] : Please see my note below. [Sincerely,] : Sincerely, [FreeTextEntry3] : Jasper Jon DO Attending Physician, Hematology/ Medical Oncology [phone] office

## 2024-03-14 DIAGNOSIS — D47.3 ESSENTIAL (HEMORRHAGIC) THROMBOCYTHEMIA: ICD-10-CM

## 2024-03-20 ENCOUNTER — APPOINTMENT (OUTPATIENT)
Dept: HEMATOLOGY ONCOLOGY | Facility: CLINIC | Age: 64
End: 2024-03-20

## 2024-04-15 ENCOUNTER — APPOINTMENT (OUTPATIENT)
Age: 64
End: 2024-04-15
Payer: COMMERCIAL

## 2024-04-15 ENCOUNTER — LABORATORY RESULT (OUTPATIENT)
Age: 64
End: 2024-04-15

## 2024-04-15 ENCOUNTER — OUTPATIENT (OUTPATIENT)
Dept: OUTPATIENT SERVICES | Facility: HOSPITAL | Age: 64
LOS: 1 days | End: 2024-04-15
Payer: COMMERCIAL

## 2024-04-15 VITALS — SYSTOLIC BLOOD PRESSURE: 159 MMHG | DIASTOLIC BLOOD PRESSURE: 113 MMHG | HEART RATE: 66 BPM

## 2024-04-15 VITALS
HEIGHT: 66 IN | HEART RATE: 74 BPM | WEIGHT: 212 LBS | DIASTOLIC BLOOD PRESSURE: 108 MMHG | BODY MASS INDEX: 34.07 KG/M2 | OXYGEN SATURATION: 97 % | TEMPERATURE: 98.8 F | RESPIRATION RATE: 16 BRPM | SYSTOLIC BLOOD PRESSURE: 172 MMHG

## 2024-04-15 DIAGNOSIS — D47.3 ESSENTIAL (HEMORRHAGIC) THROMBOCYTHEMIA: ICD-10-CM

## 2024-04-15 LAB
HCT VFR BLD CALC: 42.5 %
HGB BLD-MCNC: 14.9 G/DL
MCHC RBC-ENTMCNC: 30.2 PG
MCHC RBC-ENTMCNC: 35.1 G/DL
MCV RBC AUTO: 86.2 FL
PLATELET # BLD AUTO: 514 K/UL
PMV BLD: 9.1 FL
RBC # BLD: 4.93 M/UL
RBC # FLD: 13.1 %
WBC # FLD AUTO: 9.26 K/UL

## 2024-04-15 PROCEDURE — 80048 BASIC METABOLIC PNL TOTAL CA: CPT

## 2024-04-15 PROCEDURE — 80076 HEPATIC FUNCTION PANEL: CPT

## 2024-04-15 PROCEDURE — 99214 OFFICE O/P EST MOD 30 MIN: CPT

## 2024-04-15 PROCEDURE — 85027 COMPLETE CBC AUTOMATED: CPT

## 2024-04-15 RX ORDER — ASPIRIN 81 MG/1
81 TABLET, COATED ORAL DAILY
Qty: 90 | Refills: 1 | Status: ACTIVE | COMMUNITY
Start: 1900-01-01 | End: 1900-01-01

## 2024-04-15 RX ORDER — HYDROXYUREA 500 MG/1
500 CAPSULE ORAL
Qty: 45 | Refills: 2 | Status: ACTIVE | COMMUNITY
Start: 2022-11-29 | End: 1900-01-01

## 2024-04-15 NOTE — HISTORY OF PRESENT ILLNESS
[de-identified] : Mr. KELTON CUETO is a 62 year old male here today for evaluation and management of thrombocytosis.  He was referred by PCP, Dr. Garrett. KELTON is a 62 year old M with PMHx including HTN, hypercholesterolemia, YESSICA, asthma, GERD, thyroid nodule and anxiety, who presents to clinic to establish care. He follows with [facility] and was noted to have elevated PLTs which were then confirmed by PCP at a later date. He is presently feeling well with no new complaints. Patient denies fever, chills, nausea, vomiting, dyspnea, acute changes in vision, increasing frequent/severity of headaches, skin rash, joint aches, unintentional weight loss or bleeding. Patient reports family history of malignancy in his father (dx in his 40s - colon cancer) and sister (colon cancer - dx in her 50s).  RADIOLOGIC WORKUP CXR (11.1.2019) IMPRESSION: No radiographic evidence of acute pulmonary disease.  LAB WORKUP (9.7.2022) WBC 10.38, Hgb 15.4, MCV 86.2, RDW 13.2, , ANC 6.83, Mono 0.95 (8.11.2022) WBC 10.14, Hgb 16.9 MCV 87.2, RDW 13.5, , ANC 6.6, Lymph 2.24, Mono 0.9 (7.31.2022) WBC 8.6, Hgb 16.4,   HCM Colonoscopy done 2020 with Dr. Ayala reportedly benign COVID Vaccinated. [de-identified] : 11/29/22 Patient is here for a follow-up visit for thrombocytosis. He is feeling well with no new complaints. Reviewed most recent CBC, which showed persistent thrombocytosis with PLTs 575,000. Additional workup reveals JAK2 (+), establishing diagnosis of Essential Thrombocytosis. Reviewed US Abd which shows heterogeneous echotexture of the liver. US Abd (10.28.2022) IMPRESSION:Heterogeneous echotexture of the liver.Bilateral nonobstructing nephrolithiasis.  12/13/22 Patient is here for a follow-up visit for ET. He is feeling well with no new complaints. Reviewed most recent CBC, which shows persistent thrombocytosis with PLTs 600,000. He initiated Hydrea 500mg once daily on 12.3.2022. Patient endorses minor cramps, but they are not bothersome.  1/11/23 Patient is here for a follow-up visit for ET. He is feeling well with no new complaints. Reviewed most recent CBC, which shows PLTs 384,000. He continues on Hydrea 500mg once daily, initiated on 12.3.2022. Patient denies fever, chills, nausea, vomiting, GI upset, CP, palpitations, dyspnea, ulcers or bleeding.  2/21/23 Patient is here for a follow-up visit for ET. He is feeling well with no new complaints. Reviewed most recent CBC, which shows stable PLTs at 368,000. He states he has been taking Hydrea 500mg twice daily. Patient denies fever, chills, nausea, vomiting, GI upset, CP, palpitations, dyspnea, ulcers or bleeding. He endorses more fatigue since being on Hydrea regimen.  3/15/23 Patient is here for a follow-up visit for ET. He is feeling well with no new complaints. Reviewed most recent CBC, which shows stable PLTs at 433,000.Hydrea was held due to concerns for gi symptoms and weakness caused by hydrea.. Patient denies fever, chills, nausea, vomiting, GI upset, CP, palpitations, dyspnea, ulcers or bleeding.  5/16/23 Patient is here for a follow-up visit for ET. He is feeling well with no new complaints. Decreased the dose of hydrea to 500 mg every 3 days due to Gi issues. He mentioned PCP started him on antidiarrhea and antinausea medication and they helped. No active complaints. No vasomotor symptoms.  9/20/23 Patient is here for a follow-up visit for JAK2 (+) ET. He is feeling well with no new complaints. He has been taking hydrea 500mg once every 3 days due to GI issues. He mentioned PCP started him on antidiarrhea and antinausea medication and they helped. Reviewed most recent CBC which shows WBC 10.13, Hgb 14.9 and PLTs 206,000. He is still experiencing some nausea/diarrhea occasionally from the Hydrea. He had colonoscopy and upper EGD about 3 months ago which was reportedly normal.   11/1/2023 Patient is here for follow up visit for Jak2+ ET. He is overall feeling well with no new complaints to offer at this time. He is taking Hydrea 500 mg every 3 days. He is tolerating it well beside from mild nausea on the days he is taking the hydrea. He is treating this nausea with zofran which helps control his symptoms.   12/27/23 Patient is here for follow up visit for Jak2+ ET.  He is overall feeling well with no new complaints to offer at this time.  He continues on baby ASA daily.  He is taking Hydrea 500 mg every 3 days.  Reviewed most recent CBC which shows rise in Hydrea up to 508,000.  He is tolerating Hydrea well beside fatigue and mild nausea on the days he is taking the hydroxyurea. Denies chest pain, palpitations, worsening frequency/severity of headaches or overt bleeding.    1/30/24 Patient is here for follow up visit for Jak2+ ET.  He continues on baby ASA daily.  Since last visit, patient has been taking Hydrea 500mg every other day.  Reviewed most recent CBC which shows PLTs down to 249,000.  He is tolerating Hydrea but reports fatigue and mild nausea/diarrhea on the days he is taking the hydroxyurea.  Denies chest pain, palpitations, worsening frequency/severity of headaches or overt bleeding.    4/15/24 Patient is here for follow up visit for Jak2+ ET.  He continues on baby ASA daily.  Since last visit, patient has been taking Hydrea 500mg every third day.  Reviewed most recent CBC which shows PLTs up to 514,000.  He is tolerating Hydrea but reports fatigue and mild nausea/diarrhea on the days he is taking the hydroxyurea.  He also takes baby ASA daily.  Denies chest pain, palpitations, worsening frequency/severity of headaches or overt bleeding.

## 2024-04-15 NOTE — CONSULT LETTER
[Dear  ___] : Dear  [unfilled], [Consult Letter:] : I had the pleasure of evaluating your patient, [unfilled]. [Please see my note below.] : Please see my note below. [Sincerely,] : Sincerely, [FreeTextEntry3] : Jasper Jon DO Attending Physician, Hematology/ Medical Oncology [phone] office

## 2024-04-15 NOTE — ASSESSMENT
[FreeTextEntry1] : 62 yo man with ECOG 1 was found to have thrombocytosis (platelets count is in 500K). JAK2 positive myeloproliferative neoplasm/ essential thrombocytosis, in view of the age (>61 yo) and JAK2 status, pt has a high risk.   # JAK2 (+) Essential Thrombocytosis - reviewed radiologic workup and labs report including implications of diagnosis and management using baby ASA + cytoreductive therapy - US Abd 10/2022 shows heterogeneous echotexture of the liver, suggestive of fatty liver - experiences treatment related side effects which improve with addition of anti nausea and anti diarrhea meds, prescribed by his PMD - c/w baby ASA daily, Rx renewed - changed Hydrea 500mg to every 3 days for better tolerability (develops nausea/diarrhea with pill)   - as of 4/15/2024, increase Hydrea up to 500mg every other day as tolerated due to increase in PLTs, Rx renewed  # Family history of Colon Cancer - father diagnosed in his 40s and this is his cause of death - sister diagnosed in her 50s and in remission - briefly discussed role of genetic counseling due to diagnosis of malignancy at early age ; patient verbalized understanding and states his family is not interested at this time - s/p colonoscopy + upper endoscopy in 2022   Had discussion with patient regarding persistently elevated blood pressure and associated risks including stroke or death.  Patient is currently asymptomatic and takes anti-hypertensive medication as part of his daily medication regimen.  He was advised to take an additional dose and discuss with PCP / Cardiologist regarding more strict blood pressure control.  Reviewed signs and symptoms which warrant immediate transfer to Emergency Department.    Labwork in 2 weeks: CBC RTC 4 weeks with Dr. Jon with CBC

## 2024-04-16 DIAGNOSIS — D47.3 ESSENTIAL (HEMORRHAGIC) THROMBOCYTHEMIA: ICD-10-CM

## 2024-04-16 LAB
ALBUMIN SERPL ELPH-MCNC: 4.9 G/DL
ALP BLD-CCNC: 94 U/L
ALT SERPL-CCNC: 40 U/L
ANION GAP SERPL CALC-SCNC: 12 MMOL/L
AST SERPL-CCNC: 27 U/L
BILIRUB DIRECT SERPL-MCNC: 0.2 MG/DL
BILIRUB INDIRECT SERPL-MCNC: 0.3 MG/DL
BILIRUB SERPL-MCNC: 0.5 MG/DL
BUN SERPL-MCNC: 18 MG/DL
CALCIUM SERPL-MCNC: 9.7 MG/DL
CHLORIDE SERPL-SCNC: 105 MMOL/L
CO2 SERPL-SCNC: 24 MMOL/L
CREAT SERPL-MCNC: 1 MG/DL
EGFR: 85 ML/MIN/1.73M2
GLUCOSE SERPL-MCNC: 88 MG/DL
POTASSIUM SERPL-SCNC: 5.3 MMOL/L
PROT SERPL-MCNC: 7.4 G/DL
SODIUM SERPL-SCNC: 141 MMOL/L

## 2024-04-29 ENCOUNTER — OUTPATIENT (OUTPATIENT)
Dept: OUTPATIENT SERVICES | Facility: HOSPITAL | Age: 64
LOS: 1 days | End: 2024-04-29
Payer: COMMERCIAL

## 2024-04-29 ENCOUNTER — APPOINTMENT (OUTPATIENT)
Age: 64
End: 2024-04-29

## 2024-04-29 ENCOUNTER — LABORATORY RESULT (OUTPATIENT)
Age: 64
End: 2024-04-29

## 2024-04-29 DIAGNOSIS — D47.3 ESSENTIAL (HEMORRHAGIC) THROMBOCYTHEMIA: ICD-10-CM

## 2024-04-29 PROCEDURE — 36416 COLLJ CAPILLARY BLOOD SPEC: CPT

## 2024-04-29 PROCEDURE — 85027 COMPLETE CBC AUTOMATED: CPT

## 2024-04-30 LAB
HCT VFR BLD CALC: 42.4 %
HGB BLD-MCNC: 14.7 G/DL
MCHC RBC-ENTMCNC: 29.9 PG
MCHC RBC-ENTMCNC: 34.7 G/DL
MCV RBC AUTO: 86.4 FL
PLATELET # BLD AUTO: 418 K/UL
PMV BLD: 9.7 FL
RBC # BLD: 4.91 M/UL
RBC # FLD: 13.2 %
WBC # FLD AUTO: 9.13 K/UL

## 2024-05-20 ENCOUNTER — OUTPATIENT (OUTPATIENT)
Dept: OUTPATIENT SERVICES | Facility: HOSPITAL | Age: 64
LOS: 1 days | End: 2024-05-20
Payer: COMMERCIAL

## 2024-05-20 ENCOUNTER — LABORATORY RESULT (OUTPATIENT)
Age: 64
End: 2024-05-20

## 2024-05-20 ENCOUNTER — APPOINTMENT (OUTPATIENT)
Age: 64
End: 2024-05-20
Payer: COMMERCIAL

## 2024-05-20 VITALS
SYSTOLIC BLOOD PRESSURE: 169 MMHG | RESPIRATION RATE: 16 BRPM | OXYGEN SATURATION: 98 % | HEIGHT: 66 IN | BODY MASS INDEX: 34.65 KG/M2 | WEIGHT: 215.6 LBS | TEMPERATURE: 98 F | HEART RATE: 94 BPM | DIASTOLIC BLOOD PRESSURE: 107 MMHG

## 2024-05-20 DIAGNOSIS — Z51.81 ENCOUNTER FOR THERAPEUTIC DRUG LVL MONITORING: ICD-10-CM

## 2024-05-20 DIAGNOSIS — D47.3 ESSENTIAL (HEMORRHAGIC) THROMBOCYTHEMIA: ICD-10-CM

## 2024-05-20 LAB
HCT VFR BLD CALC: 43.3 %
HGB BLD-MCNC: 15 G/DL
MCHC RBC-ENTMCNC: 29.9 PG
MCHC RBC-ENTMCNC: 34.6 G/DL
MCV RBC AUTO: 86.4 FL
PLATELET # BLD AUTO: 329 K/UL
PMV BLD: 10.7 FL
RBC # BLD: 5.01 M/UL
RBC # FLD: 13.2 %
WBC # FLD AUTO: 9.55 K/UL

## 2024-05-20 PROCEDURE — 99214 OFFICE O/P EST MOD 30 MIN: CPT

## 2024-05-20 PROCEDURE — 85027 COMPLETE CBC AUTOMATED: CPT

## 2024-05-20 NOTE — ASSESSMENT
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 He has been treated with cytoreductive therapy including hydroxyurea. In view  of the significant GI side effects (gastric discomfort, nausea, decreased appetite, diarrhea) , the appropriate dose has been still not found properly since I am trying to balance between the lowest dose of the medication to decrease above mentioned side effects and to keep the platelets decreased. In addition, pt has to be on ASA to decrease the risk of arterial thrombosis. - US Abd 10/2022 shows heterogeneous echotexture of the liver, suggestive of fatty liver; no splenomegaly  QUEZADA ('fatty liver') could also contribute to elevated platelets count; it is imperative pt keeps his lipids profile under control     experiences treatment related side effects which improve with addition of anti nausea and anti diarrhea meds, prescribed by his PMD  - c/w baby ASA daily, Rx renewed  - changed Hydrea 500mg to every 3 days for better tolerability (develops nausea/diarrhea with pill)   - as of 4/15/2024, increase Hydrea up to 500mg every other day as tolerated due to increase in PLTs  - as of 5/20/2024, decrease Hydrea to 500mg every 3rd day  - US Abd ordered to evaluate liver, Rx given   # Family history of Colon Cancer - father diagnosed in his 40s and this is his cause of death - sister diagnosed in her 50s and in remission - briefly discussed role of genetic counseling due to diagnosis of malignancy at early age ; patient verbalized understanding and states his family is not interested at this time - s/p colonoscopy + upper endoscopy in 2022   Had discussion with patient regarding persistently elevated blood pressure and associated risks including stroke or death.  Patient is currently asymptomatic and takes anti-hypertensive medication as part of his daily medication regimen.  He was advised to take an additional dose and discuss with PCP / Cardiologist regarding more strict blood pressure control.  Reviewed signs and symptoms which warrant immediate transfer to Emergency Department.    Labwork in 2+4 weeks: CBC  RTC 6 weeks with Dr. Jon with CBC  seen/ examined w/ NP Ruth note reviewed; case discussed; agree w/ plan

## 2024-05-20 NOTE — CONSULT LETTER
[Dear  ___] : Dear  [unfilled], [Consult Letter:] : I had the pleasure of evaluating your patient, [unfilled]. [Please see my note below.] : Please see my note below. [Sincerely,] : Sincerely, [FreeTextEntry3] : Jsaper Jon DO Attending Physician, Hematology/ Medical Oncology [phone] office

## 2024-05-20 NOTE — REVIEW OF SYSTEMS
[Recent Change In Weight] : ~T recent weight change [Diarrhea: Grade 0] : Diarrhea: Grade 0 [Negative] : Allergic/Immunologic [FreeTextEntry2] : gaining weight  [FreeTextEntry7] : mild nausea/diarrhea on the days he is taking the hydroxyurea

## 2024-05-20 NOTE — HISTORY OF PRESENT ILLNESS
[de-identified] : Mr. KELTON CUETO is a 64 year old male here today for evaluation and management of thrombocytosis.  PMHx includes HTN, hypercholesterolemia, YESSICA, asthma, GERD, thyroid nodule and anxiety, who presents to clinic to establish care. He follows with [facility] and was noted to have elevated PLTs which were then confirmed by PCP at a later date. He is presently feeling well with no new complaints. Patient denies fever, chills, nausea, vomiting, dyspnea, acute changes in vision, increasing frequent/severity of headaches, skin rash, joint aches, unintentional weight loss or bleeding. Patient reports family history of malignancy in his father (dx in his 40s - colon cancer) and sister (colon cancer - dx in her 50s).  RADIOLOGIC WORKUP CXR (11.1.2019) IMPRESSION: No radiographic evidence of acute pulmonary disease.  LAB WORKUP (9.7.2022) WBC 10.38, Hgb 15.4, MCV 86.2, RDW 13.2, , ANC 6.83, Mono 0.95 (8.11.2022) WBC 10.14, Hgb 16.9 MCV 87.2, RDW 13.5, , ANC 6.6, Lymph 2.24, Mono 0.9 (7.31.2022) WBC 8.6, Hgb 16.4,   HCM Colonoscopy done 2020 with Dr. Ayala reportedly benign COVID Vaccinated. [de-identified] : 11/29/22 Patient is here for a follow-up visit for thrombocytosis. He is feeling well with no new complaints. Reviewed most recent CBC, which showed persistent thrombocytosis with PLTs 575,000. Additional workup reveals JAK2 (+), establishing diagnosis of Essential Thrombocytosis. Reviewed US Abd which shows heterogeneous echotexture of the liver. US Abd (10.28.2022) IMPRESSION:Heterogeneous echotexture of the liver.Bilateral nonobstructing nephrolithiasis.  12/13/22 Patient is here for a follow-up visit for ET. He is feeling well with no new complaints. Reviewed most recent CBC, which shows persistent thrombocytosis with PLTs 600,000. He initiated Hydrea 500mg once daily on 12.3.2022. Patient endorses minor cramps, but they are not bothersome.  1/11/23 Patient is here for a follow-up visit for ET. He is feeling well with no new complaints. Reviewed most recent CBC, which shows PLTs 384,000. He continues on Hydrea 500mg once daily, initiated on 12.3.2022. Patient denies fever, chills, nausea, vomiting, GI upset, CP, palpitations, dyspnea, ulcers or bleeding.  2/21/23 Patient is here for a follow-up visit for ET. He is feeling well with no new complaints. Reviewed most recent CBC, which shows stable PLTs at 368,000. He states he has been taking Hydrea 500mg twice daily. Patient denies fever, chills, nausea, vomiting, GI upset, CP, palpitations, dyspnea, ulcers or bleeding. He endorses more fatigue since being on Hydrea regimen.  3/15/23 Patient is here for a follow-up visit for ET. He is feeling well with no new complaints. Reviewed most recent CBC, which shows stable PLTs at 433,000.Hydrea was held due to concerns for gi symptoms and weakness caused by hydrea.. Patient denies fever, chills, nausea, vomiting, GI upset, CP, palpitations, dyspnea, ulcers or bleeding.  5/16/23 Patient is here for a follow-up visit for ET. He is feeling well with no new complaints. Decreased the dose of hydrea to 500 mg every 3 days due to Gi issues. He mentioned PCP started him on antidiarrhea and antinausea medication and they helped. No active complaints. No vasomotor symptoms.  9/20/23 Patient is here for a follow-up visit for JAK2 (+) ET. He is feeling well with no new complaints. He has been taking hydrea 500mg once every 3 days due to GI issues. He mentioned PCP started him on antidiarrhea and antinausea medication and they helped. Reviewed most recent CBC which shows WBC 10.13, Hgb 14.9 and PLTs 206,000. He is still experiencing some nausea/diarrhea occasionally from the Hydrea. He had colonoscopy and upper EGD about 3 months ago which was reportedly normal.   11/1/2023 Patient is here for follow up visit for Jak2+ ET. He is overall feeling well with no new complaints to offer at this time. He is taking Hydrea 500 mg every 3 days. He is tolerating it well beside from mild nausea on the days he is taking the hydrea. He is treating this nausea with zofran which helps control his symptoms.   12/27/23 Patient is here for follow up visit for Jak2+ ET.  He is overall feeling well with no new complaints to offer at this time.  He continues on baby ASA daily.  He is taking Hydrea 500 mg every 3 days.  Reviewed most recent CBC which shows rise in Hydrea up to 508,000.  He is tolerating Hydrea well beside fatigue and mild nausea on the days he is taking the hydroxyurea. Denies chest pain, palpitations, worsening frequency/severity of headaches or overt bleeding.    1/30/24 Patient is here for follow up visit for Jak2+ ET.  He continues on baby ASA daily.  Since last visit, patient has been taking Hydrea 500mg every other day.  Reviewed most recent CBC which shows PLTs down to 249,000.  He is tolerating Hydrea but reports fatigue and mild nausea/diarrhea on the days he is taking the hydroxyurea.  Denies chest pain, palpitations, worsening frequency/severity of headaches or overt bleeding.    4/15/24 Patient is here for follow up visit for Jak2+ ET.  He continues on baby ASA daily.  Since last visit, patient has been taking Hydrea 500mg every third day.  Reviewed most recent CBC which shows PLTs up to 514,000.  He is tolerating Hydrea but reports fatigue and mild nausea/diarrhea on the days he is taking the hydroxyurea.  He also takes baby ASA daily.  Denies chest pain, palpitations, worsening frequency/severity of headaches or overt bleeding.    5/20/24 Patient is here for follow up visit for Jak2+ ET.  He continues on baby ASA daily.  Patient has been taking Hydrea 500mg every other day.  He still notes fatigue and nausea/diarrhea/GI discomfort mostly on the days he is taking the hydroxyurea.  Reviewed most recent CBC which shows PLTs up to 514,000.  He also takes baby ASA daily.  Denies chest pain, palpitations, worsening frequency/severity of headaches or overt bleeding.

## 2024-05-21 DIAGNOSIS — D47.3 ESSENTIAL (HEMORRHAGIC) THROMBOCYTHEMIA: ICD-10-CM

## 2024-06-03 ENCOUNTER — OUTPATIENT (OUTPATIENT)
Dept: OUTPATIENT SERVICES | Facility: HOSPITAL | Age: 64
LOS: 1 days | End: 2024-06-03
Payer: COMMERCIAL

## 2024-06-03 ENCOUNTER — APPOINTMENT (OUTPATIENT)
Age: 64
End: 2024-06-03

## 2024-06-03 ENCOUNTER — LABORATORY RESULT (OUTPATIENT)
Age: 64
End: 2024-06-03

## 2024-06-03 DIAGNOSIS — D47.3 ESSENTIAL (HEMORRHAGIC) THROMBOCYTHEMIA: ICD-10-CM

## 2024-06-03 LAB
HCT VFR BLD CALC: 44.9 %
HGB BLD-MCNC: 15.5 G/DL
MCHC RBC-ENTMCNC: 30.5 PG
MCHC RBC-ENTMCNC: 34.5 G/DL
MCV RBC AUTO: 88.2 FL
PLATELET # BLD AUTO: 191 K/UL
PMV BLD: 11.4 FL
RBC # BLD: 5.09 M/UL
RBC # FLD: 13.1 %
WBC # FLD AUTO: 9.03 K/UL

## 2024-06-03 PROCEDURE — 85027 COMPLETE CBC AUTOMATED: CPT

## 2024-06-03 PROCEDURE — 36416 COLLJ CAPILLARY BLOOD SPEC: CPT

## 2024-06-04 DIAGNOSIS — D47.3 ESSENTIAL (HEMORRHAGIC) THROMBOCYTHEMIA: ICD-10-CM

## 2024-06-17 ENCOUNTER — OUTPATIENT (OUTPATIENT)
Dept: OUTPATIENT SERVICES | Facility: HOSPITAL | Age: 64
LOS: 1 days | End: 2024-06-17
Payer: COMMERCIAL

## 2024-06-17 ENCOUNTER — APPOINTMENT (OUTPATIENT)
Age: 64
End: 2024-06-17

## 2024-06-17 ENCOUNTER — LABORATORY RESULT (OUTPATIENT)
Age: 64
End: 2024-06-17

## 2024-06-17 DIAGNOSIS — D47.3 ESSENTIAL (HEMORRHAGIC) THROMBOCYTHEMIA: ICD-10-CM

## 2024-06-17 LAB
HCT VFR BLD CALC: 44.1 %
HGB BLD-MCNC: 15.3 G/DL
MCHC RBC-ENTMCNC: 30.1 PG
MCHC RBC-ENTMCNC: 34.7 G/DL
MCV RBC AUTO: 86.8 FL
PLATELET # BLD AUTO: 422 K/UL
PMV BLD: 9.7 FL
RBC # BLD: 5.08 M/UL
RBC # FLD: 12.5 %
WBC # FLD AUTO: 7.6 K/UL

## 2024-06-17 PROCEDURE — 85027 COMPLETE CBC AUTOMATED: CPT

## 2024-06-17 PROCEDURE — 36416 COLLJ CAPILLARY BLOOD SPEC: CPT

## 2024-07-01 ENCOUNTER — APPOINTMENT (OUTPATIENT)
Age: 64
End: 2024-07-01

## 2024-07-02 ENCOUNTER — APPOINTMENT (OUTPATIENT)
Age: 64
End: 2024-07-02

## 2024-07-02 ENCOUNTER — LABORATORY RESULT (OUTPATIENT)
Age: 64
End: 2024-07-02

## 2024-07-02 ENCOUNTER — OUTPATIENT (OUTPATIENT)
Dept: OUTPATIENT SERVICES | Facility: HOSPITAL | Age: 64
LOS: 1 days | End: 2024-07-02
Payer: COMMERCIAL

## 2024-07-02 DIAGNOSIS — D47.3 ESSENTIAL (HEMORRHAGIC) THROMBOCYTHEMIA: ICD-10-CM

## 2024-07-02 PROCEDURE — 36416 COLLJ CAPILLARY BLOOD SPEC: CPT

## 2024-07-02 PROCEDURE — 85027 COMPLETE CBC AUTOMATED: CPT

## 2024-07-03 DIAGNOSIS — D47.3 ESSENTIAL (HEMORRHAGIC) THROMBOCYTHEMIA: ICD-10-CM

## 2024-07-03 LAB
HCT VFR BLD CALC: 44.2 %
HGB BLD-MCNC: 15.4 G/DL
MCHC RBC-ENTMCNC: 29.9 PG
MCHC RBC-ENTMCNC: 34.8 G/DL
MCV RBC AUTO: 85.8 FL
PLATELET # BLD AUTO: 457 K/UL
PMV BLD: 9.6 FL
RBC # BLD: 5.15 M/UL
RBC # FLD: 12.8 %
WBC # FLD AUTO: 9.59 K/UL

## 2024-07-10 ENCOUNTER — LABORATORY RESULT (OUTPATIENT)
Age: 64
End: 2024-07-10

## 2024-07-10 ENCOUNTER — APPOINTMENT (OUTPATIENT)
Age: 64
End: 2024-07-10
Payer: COMMERCIAL

## 2024-07-10 ENCOUNTER — OUTPATIENT (OUTPATIENT)
Dept: OUTPATIENT SERVICES | Facility: HOSPITAL | Age: 64
LOS: 1 days | End: 2024-07-10
Payer: COMMERCIAL

## 2024-07-10 VITALS
DIASTOLIC BLOOD PRESSURE: 91 MMHG | HEIGHT: 66 IN | WEIGHT: 214 LBS | HEART RATE: 87 BPM | OXYGEN SATURATION: 96 % | RESPIRATION RATE: 16 BRPM | SYSTOLIC BLOOD PRESSURE: 153 MMHG | TEMPERATURE: 98.1 F | BODY MASS INDEX: 34.39 KG/M2

## 2024-07-10 DIAGNOSIS — D47.3 ESSENTIAL (HEMORRHAGIC) THROMBOCYTHEMIA: ICD-10-CM

## 2024-07-10 DIAGNOSIS — Z51.81 ENCOUNTER FOR THERAPEUTIC DRUG LVL MONITORING: ICD-10-CM

## 2024-07-10 LAB
HCT VFR BLD CALC: 44.1 %
HGB BLD-MCNC: 15.2 G/DL
MCHC RBC-ENTMCNC: 34.5 G/DL
MCV RBC AUTO: 86.8 FL
PLATELET # BLD AUTO: 160 K/UL
PMV BLD: 10.4 FL
RBC # BLD: 5.08 M/UL
WBC # FLD AUTO: 9.57 K/UL

## 2024-07-10 PROCEDURE — 99214 OFFICE O/P EST MOD 30 MIN: CPT

## 2024-07-10 PROCEDURE — G2211 COMPLEX E/M VISIT ADD ON: CPT

## 2024-07-10 PROCEDURE — 85027 COMPLETE CBC AUTOMATED: CPT

## 2024-07-10 RX ORDER — RIVAROXABAN 20 MG/1
20 TABLET, FILM COATED ORAL
Qty: 30 | Refills: 0 | Status: ACTIVE | COMMUNITY
Start: 2024-07-10 | End: 2024-08-09

## 2024-07-15 ENCOUNTER — NON-APPOINTMENT (OUTPATIENT)
Age: 64
End: 2024-07-15

## 2024-07-31 ENCOUNTER — APPOINTMENT (OUTPATIENT)
Age: 64
End: 2024-07-31

## 2024-08-28 ENCOUNTER — APPOINTMENT (OUTPATIENT)
Age: 64
End: 2024-08-28

## 2024-09-09 ENCOUNTER — APPOINTMENT (OUTPATIENT)
Age: 64
End: 2024-09-09
Payer: COMMERCIAL

## 2024-09-09 ENCOUNTER — OUTPATIENT (OUTPATIENT)
Dept: OUTPATIENT SERVICES | Facility: HOSPITAL | Age: 64
LOS: 1 days | End: 2024-09-09
Payer: COMMERCIAL

## 2024-09-09 ENCOUNTER — LABORATORY RESULT (OUTPATIENT)
Age: 64
End: 2024-09-09

## 2024-09-09 VITALS
SYSTOLIC BLOOD PRESSURE: 166 MMHG | HEIGHT: 66.5 IN | HEART RATE: 75 BPM | BODY MASS INDEX: 34.2 KG/M2 | TEMPERATURE: 98.2 F | WEIGHT: 215.38 LBS | DIASTOLIC BLOOD PRESSURE: 107 MMHG | OXYGEN SATURATION: 97 %

## 2024-09-09 DIAGNOSIS — Z51.81 ENCOUNTER FOR THERAPEUTIC DRUG LVL MONITORING: ICD-10-CM

## 2024-09-09 DIAGNOSIS — D47.3 ESSENTIAL (HEMORRHAGIC) THROMBOCYTHEMIA: ICD-10-CM

## 2024-09-09 LAB
HCT VFR BLD CALC: 43.5 %
HGB BLD-MCNC: 15.2 G/DL
MCHC RBC-ENTMCNC: 30.2 PG
MCHC RBC-ENTMCNC: 34.9 G/DL
MCV RBC AUTO: 86.5 FL
PLATELET # BLD AUTO: 392 K/UL
PMV BLD: 9.8 FL
RBC # BLD: 5.03 M/UL
RBC # FLD: 13.3 %
WBC # FLD AUTO: 10.09 K/UL

## 2024-09-09 PROCEDURE — 99214 OFFICE O/P EST MOD 30 MIN: CPT

## 2024-09-09 PROCEDURE — 85027 COMPLETE CBC AUTOMATED: CPT

## 2024-09-09 NOTE — CONSULT LETTER
[Dear  ___] : Dear  [unfilled], [Consult Letter:] : I had the pleasure of evaluating your patient, [unfilled]. [Please see my note below.] : Please see my note below. [Sincerely,] : Sincerely, [FreeTextEntry3] : Jasper Jon DO Attending Physician, Hematology/ Medical Oncology [phone] office

## 2024-09-09 NOTE — REVIEW OF SYSTEMS
[Fatigue] : fatigue [Recent Change In Weight] : ~T recent weight change [Diarrhea: Grade 0] : Diarrhea: Grade 0 [Negative] : Allergic/Immunologic [FreeTextEntry2] : gaining weight  [FreeTextEntry7] : mild nausea/diarrhea on the days he is taking the hydroxyurea

## 2024-09-09 NOTE — CONSULT LETTER
[Dear  ___] : Dear  [unfilled], [Consult Letter:] : I had the pleasure of evaluating your patient, [unfilled]. [Please see my note below.] : Please see my note below. [Sincerely,] : Sincerely, [FreeTextEntry3] : Jasper Jon DO Attending Physician, Hematology/ Medical Oncology [phone] office

## 2024-09-09 NOTE — HISTORY OF PRESENT ILLNESS
[de-identified] : Mr. KELTON CUETO is a 64 year old male here today for evaluation and management of thrombocytosis.  PMHx includes HTN, hypercholesterolemia, YESSICA, asthma, GERD, thyroid nodule and anxiety, who presents to clinic to establish care. He follows with [facility] and was noted to have elevated PLTs which were then confirmed by PCP at a later date. He is presently feeling well with no new complaints. Patient denies fever, chills, nausea, vomiting, dyspnea, acute changes in vision, increasing frequent/severity of headaches, skin rash, joint aches, unintentional weight loss or bleeding. Patient reports family history of malignancy in his father (dx in his 40s - colon cancer) and sister (colon cancer - dx in her 50s).  RADIOLOGIC WORKUP CXR (11.1.2019) IMPRESSION: No radiographic evidence of acute pulmonary disease.  LAB WORKUP (9.7.2022) WBC 10.38, Hgb 15.4, MCV 86.2, RDW 13.2, , ANC 6.83, Mono 0.95 (8.11.2022) WBC 10.14, Hgb 16.9 MCV 87.2, RDW 13.5, , ANC 6.6, Lymph 2.24, Mono 0.9 (7.31.2022) WBC 8.6, Hgb 16.4,   HCM Colonoscopy done 2020 with Dr. Ayala reportedly benign COVID Vaccinated. [de-identified] : 11/29/22 Patient is here for a follow-up visit for thrombocytosis. He is feeling well with no new complaints. Reviewed most recent CBC, which showed persistent thrombocytosis with PLTs 575,000. Additional workup reveals JAK2 (+), establishing diagnosis of Essential Thrombocytosis. Reviewed US Abd which shows heterogeneous echotexture of the liver. US Abd (10.28.2022) IMPRESSION:Heterogeneous echotexture of the liver.Bilateral nonobstructing nephrolithiasis.  12/13/22 Patient is here for a follow-up visit for ET. He is feeling well with no new complaints. Reviewed most recent CBC, which shows persistent thrombocytosis with PLTs 600,000. He initiated Hydrea 500mg once daily on 12.3.2022. Patient endorses minor cramps, but they are not bothersome.  1/11/23 Patient is here for a follow-up visit for ET. He is feeling well with no new complaints. Reviewed most recent CBC, which shows PLTs 384,000. He continues on Hydrea 500mg once daily, initiated on 12.3.2022. Patient denies fever, chills, nausea, vomiting, GI upset, CP, palpitations, dyspnea, ulcers or bleeding.  2/21/23 Patient is here for a follow-up visit for ET. He is feeling well with no new complaints. Reviewed most recent CBC, which shows stable PLTs at 368,000. He states he has been taking Hydrea 500mg twice daily. Patient denies fever, chills, nausea, vomiting, GI upset, CP, palpitations, dyspnea, ulcers or bleeding. He endorses more fatigue since being on Hydrea regimen.  3/15/23 Patient is here for a follow-up visit for ET. He is feeling well with no new complaints. Reviewed most recent CBC, which shows stable PLTs at 433,000.Hydrea was held due to concerns for gi symptoms and weakness caused by hydrea.. Patient denies fever, chills, nausea, vomiting, GI upset, CP, palpitations, dyspnea, ulcers or bleeding.  5/16/23 Patient is here for a follow-up visit for ET. He is feeling well with no new complaints. Decreased the dose of hydrea to 500 mg every 3 days due to Gi issues. He mentioned PCP started him on antidiarrhea and antinausea medication and they helped. No active complaints. No vasomotor symptoms.  9/20/23 Patient is here for a follow-up visit for JAK2 (+) ET. He is feeling well with no new complaints. He has been taking hydrea 500mg once every 3 days due to GI issues. He mentioned PCP started him on antidiarrhea and antinausea medication and they helped. Reviewed most recent CBC which shows WBC 10.13, Hgb 14.9 and PLTs 206,000. He is still experiencing some nausea/diarrhea occasionally from the Hydrea. He had colonoscopy and upper EGD about 3 months ago which was reportedly normal.   11/1/2023 Patient is here for follow up visit for Jak2+ ET. He is overall feeling well with no new complaints to offer at this time. He is taking Hydrea 500 mg every 3 days. He is tolerating it well beside from mild nausea on the days he is taking the hydrea. He is treating this nausea with zofran which helps control his symptoms.   12/27/23 Patient is here for follow up visit for Jak2+ ET.  He is overall feeling well with no new complaints to offer at this time.  He continues on baby ASA daily.  He is taking Hydrea 500 mg every 3 days.  Reviewed most recent CBC which shows rise in Hydrea up to 508,000.  He is tolerating Hydrea well beside fatigue and mild nausea on the days he is taking the hydroxyurea. Denies chest pain, palpitations, worsening frequency/severity of headaches or overt bleeding.    1/30/24 Patient is here for follow up visit for Jak2+ ET.  He continues on baby ASA daily.  Since last visit, patient has been taking Hydrea 500mg every other day.  Reviewed most recent CBC which shows PLTs down to 249,000.  He is tolerating Hydrea but reports fatigue and mild nausea/diarrhea on the days he is taking the hydroxyurea.  Denies chest pain, palpitations, worsening frequency/severity of headaches or overt bleeding.    4/15/24 Patient is here for follow up visit for Jak2+ ET.  He continues on baby ASA daily.  Since last visit, patient has been taking Hydrea 500mg every third day.  Reviewed most recent CBC which shows PLTs up to 514,000.  He is tolerating Hydrea but reports fatigue and mild nausea/diarrhea on the days he is taking the hydroxyurea.  He also takes baby ASA daily.  Denies chest pain, palpitations, worsening frequency/severity of headaches or overt bleeding.    5/20/24 Patient is here for follow up visit for Jak2+ ET.  He continues on baby ASA daily.  Patient has been taking Hydrea 500mg every other day.  He still notes fatigue and nausea/diarrhea/GI discomfort mostly on the days he is taking the hydroxyurea.  Reviewed most recent CBC which shows PLTs up to 514,000.  He also takes baby ASA daily.  Denies chest pain, palpitations, worsening frequency/severity of headaches or overt bleeding.    9/9/24 Patient is here for follow up visit for Jak2+ ET.  He continues on baby ASA daily.  Patient has been taking Hydrea 500mg every third day.  He still notes fatigue and nausea/diarrhea/GI discomfort mostly on the days he is taking the hydroxyurea.  He does not offer any new complaints.  Reviewed most recent CBC which shows PLTs at 392,000.  He also takes baby ASA daily.  Denies chest pain, palpitations, worsening frequency/severity of headaches or overt bleeding.

## 2024-09-09 NOTE — ASSESSMENT
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 He has been treated with cytoreductive therapy including hydroxyurea. In view  of the significant GI side effects (gastric discomfort, nausea, decreased appetite, diarrhea) , the appropriate dose has been still not found properly since I am trying to balance between the lowest dose of the medication to decrease above mentioned side effects and to keep the platelets decreased. In addition, pt has to be on ASA to decrease the risk of arterial thrombosis. - US Abd 10/2022 shows heterogeneous echotexture of the liver, suggestive of fatty liver; no splenomegaly  QUEZADA ('fatty liver') could also contribute to elevated platelets count; it is imperative pt keeps his lipids profile under control   experiences treatment related side effects which improve with addition of anti nausea and anti diarrhea meds, prescribed by his PMD  - c/w baby ASA daily, Rx renewed  - as of 4/15/2024, increase Hydrea up to 500mg every other day as tolerated due to increase in PLTs  - as of 5/20/2024, decrease Hydrea to 500mg every 3rd day  - c/w Hydrea 500mg to every 3 days for better tolerability (develops nausea/diarrhea/GI sx with Hydrea) , Rx renewed - US Abd ordered to evaluate liver, Rx given but not yet performed   # Family history of Colon Cancer - father diagnosed in his 40s and this is his cause of death - sister diagnosed in her 50s and in remission - briefly discussed role of genetic counseling due to diagnosis of malignancy at early age ; patient verbalized understanding and states his family is not interested at this time - s/p colonoscopy + upper endoscopy in 2022  Sent Xarelto 20mg to take on days of long flights since he travels often abroad, possible side effects discussed.   Patient advised to call PCP ASAP to discuss more strict control of BP.  He already takes Lisinopril; recommend to take an additional dose and contact PCP to determine if titration vs second agent may be appropriate for better control; discuss risks of poorly controlled BP which include stroke and death.  Patient verbalized understanding.     Labwork in 4-6 weeks: CBC  RTC 2-3 months with Dr. Jon with CBC

## 2024-09-09 NOTE — HISTORY OF PRESENT ILLNESS
[de-identified] : Mr. KELTON CUETO is a 64 year old male here today for evaluation and management of thrombocytosis.  PMHx includes HTN, hypercholesterolemia, YESSICA, asthma, GERD, thyroid nodule and anxiety, who presents to clinic to establish care. He follows with [facility] and was noted to have elevated PLTs which were then confirmed by PCP at a later date. He is presently feeling well with no new complaints. Patient denies fever, chills, nausea, vomiting, dyspnea, acute changes in vision, increasing frequent/severity of headaches, skin rash, joint aches, unintentional weight loss or bleeding. Patient reports family history of malignancy in his father (dx in his 40s - colon cancer) and sister (colon cancer - dx in her 50s).  RADIOLOGIC WORKUP CXR (11.1.2019) IMPRESSION: No radiographic evidence of acute pulmonary disease.  LAB WORKUP (9.7.2022) WBC 10.38, Hgb 15.4, MCV 86.2, RDW 13.2, , ANC 6.83, Mono 0.95 (8.11.2022) WBC 10.14, Hgb 16.9 MCV 87.2, RDW 13.5, , ANC 6.6, Lymph 2.24, Mono 0.9 (7.31.2022) WBC 8.6, Hgb 16.4,   HCM Colonoscopy done 2020 with Dr. Ayala reportedly benign COVID Vaccinated. [de-identified] : 11/29/22 Patient is here for a follow-up visit for thrombocytosis. He is feeling well with no new complaints. Reviewed most recent CBC, which showed persistent thrombocytosis with PLTs 575,000. Additional workup reveals JAK2 (+), establishing diagnosis of Essential Thrombocytosis. Reviewed US Abd which shows heterogeneous echotexture of the liver. US Abd (10.28.2022) IMPRESSION:Heterogeneous echotexture of the liver.Bilateral nonobstructing nephrolithiasis.  12/13/22 Patient is here for a follow-up visit for ET. He is feeling well with no new complaints. Reviewed most recent CBC, which shows persistent thrombocytosis with PLTs 600,000. He initiated Hydrea 500mg once daily on 12.3.2022. Patient endorses minor cramps, but they are not bothersome.  1/11/23 Patient is here for a follow-up visit for ET. He is feeling well with no new complaints. Reviewed most recent CBC, which shows PLTs 384,000. He continues on Hydrea 500mg once daily, initiated on 12.3.2022. Patient denies fever, chills, nausea, vomiting, GI upset, CP, palpitations, dyspnea, ulcers or bleeding.  2/21/23 Patient is here for a follow-up visit for ET. He is feeling well with no new complaints. Reviewed most recent CBC, which shows stable PLTs at 368,000. He states he has been taking Hydrea 500mg twice daily. Patient denies fever, chills, nausea, vomiting, GI upset, CP, palpitations, dyspnea, ulcers or bleeding. He endorses more fatigue since being on Hydrea regimen.  3/15/23 Patient is here for a follow-up visit for ET. He is feeling well with no new complaints. Reviewed most recent CBC, which shows stable PLTs at 433,000.Hydrea was held due to concerns for gi symptoms and weakness caused by hydrea.. Patient denies fever, chills, nausea, vomiting, GI upset, CP, palpitations, dyspnea, ulcers or bleeding.  5/16/23 Patient is here for a follow-up visit for ET. He is feeling well with no new complaints. Decreased the dose of hydrea to 500 mg every 3 days due to Gi issues. He mentioned PCP started him on antidiarrhea and antinausea medication and they helped. No active complaints. No vasomotor symptoms.  9/20/23 Patient is here for a follow-up visit for JAK2 (+) ET. He is feeling well with no new complaints. He has been taking hydrea 500mg once every 3 days due to GI issues. He mentioned PCP started him on antidiarrhea and antinausea medication and they helped. Reviewed most recent CBC which shows WBC 10.13, Hgb 14.9 and PLTs 206,000. He is still experiencing some nausea/diarrhea occasionally from the Hydrea. He had colonoscopy and upper EGD about 3 months ago which was reportedly normal.   11/1/2023 Patient is here for follow up visit for Jak2+ ET. He is overall feeling well with no new complaints to offer at this time. He is taking Hydrea 500 mg every 3 days. He is tolerating it well beside from mild nausea on the days he is taking the hydrea. He is treating this nausea with zofran which helps control his symptoms.   12/27/23 Patient is here for follow up visit for Jak2+ ET.  He is overall feeling well with no new complaints to offer at this time.  He continues on baby ASA daily.  He is taking Hydrea 500 mg every 3 days.  Reviewed most recent CBC which shows rise in Hydrea up to 508,000.  He is tolerating Hydrea well beside fatigue and mild nausea on the days he is taking the hydroxyurea. Denies chest pain, palpitations, worsening frequency/severity of headaches or overt bleeding.    1/30/24 Patient is here for follow up visit for Jak2+ ET.  He continues on baby ASA daily.  Since last visit, patient has been taking Hydrea 500mg every other day.  Reviewed most recent CBC which shows PLTs down to 249,000.  He is tolerating Hydrea but reports fatigue and mild nausea/diarrhea on the days he is taking the hydroxyurea.  Denies chest pain, palpitations, worsening frequency/severity of headaches or overt bleeding.    4/15/24 Patient is here for follow up visit for Jak2+ ET.  He continues on baby ASA daily.  Since last visit, patient has been taking Hydrea 500mg every third day.  Reviewed most recent CBC which shows PLTs up to 514,000.  He is tolerating Hydrea but reports fatigue and mild nausea/diarrhea on the days he is taking the hydroxyurea.  He also takes baby ASA daily.  Denies chest pain, palpitations, worsening frequency/severity of headaches or overt bleeding.    5/20/24 Patient is here for follow up visit for Jak2+ ET.  He continues on baby ASA daily.  Patient has been taking Hydrea 500mg every other day.  He still notes fatigue and nausea/diarrhea/GI discomfort mostly on the days he is taking the hydroxyurea.  Reviewed most recent CBC which shows PLTs up to 514,000.  He also takes baby ASA daily.  Denies chest pain, palpitations, worsening frequency/severity of headaches or overt bleeding.    9/9/24 Patient is here for follow up visit for Jak2+ ET.  He continues on baby ASA daily.  Patient has been taking Hydrea 500mg every third day.  He still notes fatigue and nausea/diarrhea/GI discomfort mostly on the days he is taking the hydroxyurea.  He does not offer any new complaints.  Reviewed most recent CBC which shows PLTs at 392,000.  He also takes baby ASA daily.  Denies chest pain, palpitations, worsening frequency/severity of headaches or overt bleeding.

## 2024-09-10 DIAGNOSIS — D47.3 ESSENTIAL (HEMORRHAGIC) THROMBOCYTHEMIA: ICD-10-CM

## 2024-10-22 ENCOUNTER — LABORATORY RESULT (OUTPATIENT)
Age: 64
End: 2024-10-22

## 2024-10-22 ENCOUNTER — APPOINTMENT (OUTPATIENT)
Age: 64
End: 2024-10-22

## 2024-10-22 ENCOUNTER — OUTPATIENT (OUTPATIENT)
Dept: OUTPATIENT SERVICES | Facility: HOSPITAL | Age: 64
LOS: 1 days | End: 2024-10-22
Payer: COMMERCIAL

## 2024-10-22 DIAGNOSIS — D47.3 ESSENTIAL (HEMORRHAGIC) THROMBOCYTHEMIA: ICD-10-CM

## 2024-10-22 LAB
HCT VFR BLD CALC: 44.1 %
HGB BLD-MCNC: 15.5 G/DL
MCHC RBC-ENTMCNC: 30.3 PG
MCHC RBC-ENTMCNC: 35.1 G/DL
MCV RBC AUTO: 86.3 FL
PLATELET # BLD AUTO: 376 K/UL
PMV BLD: 9.9 FL
RBC # BLD: 5.11 M/UL
RBC # FLD: 13.2 %
WBC # FLD AUTO: 8.83 K/UL

## 2024-10-22 PROCEDURE — 36416 COLLJ CAPILLARY BLOOD SPEC: CPT

## 2024-10-22 PROCEDURE — 85027 COMPLETE CBC AUTOMATED: CPT

## 2024-10-23 DIAGNOSIS — D47.3 ESSENTIAL (HEMORRHAGIC) THROMBOCYTHEMIA: ICD-10-CM

## 2024-12-30 ENCOUNTER — APPOINTMENT (OUTPATIENT)
Age: 64
End: 2024-12-30
Payer: COMMERCIAL

## 2024-12-30 ENCOUNTER — LABORATORY RESULT (OUTPATIENT)
Age: 64
End: 2024-12-30

## 2024-12-30 ENCOUNTER — OUTPATIENT (OUTPATIENT)
Dept: OUTPATIENT SERVICES | Facility: HOSPITAL | Age: 64
LOS: 1 days | End: 2024-12-30
Payer: COMMERCIAL

## 2024-12-30 VITALS
OXYGEN SATURATION: 98 % | BODY MASS INDEX: 34.87 KG/M2 | HEIGHT: 66.5 IN | SYSTOLIC BLOOD PRESSURE: 163 MMHG | DIASTOLIC BLOOD PRESSURE: 104 MMHG | HEART RATE: 86 BPM | WEIGHT: 219.56 LBS | TEMPERATURE: 98.3 F

## 2024-12-30 VITALS — HEART RATE: 84 BPM | DIASTOLIC BLOOD PRESSURE: 98 MMHG | SYSTOLIC BLOOD PRESSURE: 161 MMHG

## 2024-12-30 DIAGNOSIS — D47.3 ESSENTIAL (HEMORRHAGIC) THROMBOCYTHEMIA: ICD-10-CM

## 2024-12-30 LAB
HCT VFR BLD CALC: 45.4 %
HGB BLD-MCNC: 15.4 G/DL
MCHC RBC-ENTMCNC: 29.9 PG
MCHC RBC-ENTMCNC: 33.9 G/DL
MCV RBC AUTO: 88.2 FL
PLATELET # BLD AUTO: 435 K/UL
PMV BLD: 9.2 FL
RBC # BLD: 5.15 M/UL
RBC # FLD: 13 %
WBC # FLD AUTO: 8.18 K/UL

## 2024-12-30 PROCEDURE — 99214 OFFICE O/P EST MOD 30 MIN: CPT

## 2024-12-30 PROCEDURE — G2211 COMPLEX E/M VISIT ADD ON: CPT

## 2024-12-30 PROCEDURE — 80076 HEPATIC FUNCTION PANEL: CPT

## 2024-12-30 PROCEDURE — 85027 COMPLETE CBC AUTOMATED: CPT

## 2024-12-30 PROCEDURE — 80048 BASIC METABOLIC PNL TOTAL CA: CPT

## 2024-12-31 DIAGNOSIS — D47.3 ESSENTIAL (HEMORRHAGIC) THROMBOCYTHEMIA: ICD-10-CM

## 2024-12-31 LAB
ALBUMIN SERPL ELPH-MCNC: 4.9 G/DL
ALP BLD-CCNC: 101 U/L
ALT SERPL-CCNC: 45 U/L
ANION GAP SERPL CALC-SCNC: 15 MMOL/L
AST SERPL-CCNC: 29 U/L
BILIRUB DIRECT SERPL-MCNC: <0.2 MG/DL
BILIRUB INDIRECT SERPL-MCNC: >0.1 MG/DL
BILIRUB SERPL-MCNC: 0.3 MG/DL
BUN SERPL-MCNC: 17 MG/DL
CALCIUM SERPL-MCNC: 10.2 MG/DL
CHLORIDE SERPL-SCNC: 101 MMOL/L
CO2 SERPL-SCNC: 21 MMOL/L
CREAT SERPL-MCNC: 1.2 MG/DL
EGFR: 68 ML/MIN/1.73M2
GLUCOSE SERPL-MCNC: 107 MG/DL
POTASSIUM SERPL-SCNC: 4.5 MMOL/L
PROT SERPL-MCNC: 7.5 G/DL
SODIUM SERPL-SCNC: 137 MMOL/L

## 2025-01-29 ENCOUNTER — APPOINTMENT (OUTPATIENT)
Age: 65
End: 2025-01-29

## 2025-02-26 ENCOUNTER — APPOINTMENT (OUTPATIENT)
Age: 65
End: 2025-02-26
Payer: COMMERCIAL

## 2025-02-26 ENCOUNTER — OUTPATIENT (OUTPATIENT)
Dept: OUTPATIENT SERVICES | Facility: HOSPITAL | Age: 65
LOS: 1 days | End: 2025-02-26
Payer: COMMERCIAL

## 2025-02-26 VITALS
DIASTOLIC BLOOD PRESSURE: 88 MMHG | WEIGHT: 216 LBS | HEIGHT: 66.5 IN | TEMPERATURE: 97.7 F | HEART RATE: 79 BPM | RESPIRATION RATE: 16 BRPM | SYSTOLIC BLOOD PRESSURE: 135 MMHG | BODY MASS INDEX: 34.31 KG/M2 | OXYGEN SATURATION: 97 %

## 2025-02-26 DIAGNOSIS — Z51.81 ENCOUNTER FOR THERAPEUTIC DRUG LVL MONITORING: ICD-10-CM

## 2025-02-26 DIAGNOSIS — K21.9 GASTRO-ESOPHAGEAL REFLUX DISEASE W/OUT ESOPHAGITIS: ICD-10-CM

## 2025-02-26 DIAGNOSIS — D47.3 ESSENTIAL (HEMORRHAGIC) THROMBOCYTHEMIA: ICD-10-CM

## 2025-02-26 LAB
AUTO BASOPHILS #: 0.08 K/UL
AUTO BASOPHILS %: 0.9 %
AUTO EOSINOPHILS #: 0.16 K/UL
AUTO EOSINOPHILS %: 1.8 %
AUTO IMMATURE GRANULOCYTES #: 0.05 K/UL
AUTO LYMPHOCYTES #: 1.89 K/UL
AUTO LYMPHOCYTES %: 21.8 %
AUTO MONOCYTES #: 0.81 K/UL
AUTO MONOCYTES %: 9.3 %
AUTO NEUTROPHILS #: 5.69 K/UL
AUTO NEUTROPHILS %: 65.6 %
AUTO NRBC #: 0 K/UL
HCT VFR BLD CALC: 45.3 %
HGB BLD-MCNC: 15.6 G/DL
IMM GRANULOCYTES NFR BLD AUTO: 0.6 %
MAN DIFF?: NORMAL
MCHC RBC-ENTMCNC: 30.2 PG
MCHC RBC-ENTMCNC: 34.4 G/DL
MCV RBC AUTO: 87.8 FL
PLATELET # BLD AUTO: 397 K/UL
PMV BLD AUTO: 0 /100 WBCS
PMV BLD: 9.5 FL
RBC # BLD: 5.16 M/UL
RBC # FLD: 14.1 %
WBC # FLD AUTO: 8.68 K/UL

## 2025-02-26 PROCEDURE — 99214 OFFICE O/P EST MOD 30 MIN: CPT

## 2025-02-26 PROCEDURE — 85025 COMPLETE CBC W/AUTO DIFF WBC: CPT

## 2025-02-26 RX ORDER — LISINOPRIL 5 MG/1
5 TABLET ORAL DAILY
Refills: 0 | Status: ACTIVE | COMMUNITY

## 2025-02-26 RX ORDER — OMEPRAZOLE 40 MG/1
40 CAPSULE, DELAYED RELEASE ORAL
Refills: 0 | Status: ACTIVE | COMMUNITY

## 2025-02-27 DIAGNOSIS — D47.3 ESSENTIAL (HEMORRHAGIC) THROMBOCYTHEMIA: ICD-10-CM

## 2025-04-09 ENCOUNTER — APPOINTMENT (OUTPATIENT)
Age: 65
End: 2025-04-09

## 2025-04-09 ENCOUNTER — OUTPATIENT (OUTPATIENT)
Dept: OUTPATIENT SERVICES | Facility: HOSPITAL | Age: 65
LOS: 1 days | End: 2025-04-09
Payer: COMMERCIAL

## 2025-04-09 DIAGNOSIS — D47.3 ESSENTIAL (HEMORRHAGIC) THROMBOCYTHEMIA: ICD-10-CM

## 2025-04-09 LAB
AUTO BASOPHILS #: 0.09 K/UL
AUTO BASOPHILS %: 1 %
AUTO EOSINOPHILS #: 0.24 K/UL
AUTO EOSINOPHILS %: 2.7 %
AUTO IMMATURE GRANULOCYTES #: 0.06 K/UL
AUTO LYMPHOCYTES #: 1.91 K/UL
AUTO LYMPHOCYTES %: 21.7 %
AUTO MONOCYTES #: 0.78 K/UL
AUTO MONOCYTES %: 8.9 %
AUTO NEUTROPHILS #: 5.72 K/UL
AUTO NEUTROPHILS %: 65 %
AUTO NRBC #: 0 K/UL
HCT VFR BLD CALC: 44.5 %
HGB BLD-MCNC: 15.3 G/DL
IMM GRANULOCYTES NFR BLD AUTO: 0.7 %
MAN DIFF?: NORMAL
MCHC RBC-ENTMCNC: 30.7 PG
MCHC RBC-ENTMCNC: 34.4 G/DL
MCV RBC AUTO: 89.2 FL
PLATELET # BLD AUTO: 355 K/UL
PMV BLD AUTO: 0 /100 WBCS
PMV BLD: 9.8 FL
RBC # BLD: 4.99 M/UL
RBC # FLD: 14.1 %
WBC # FLD AUTO: 8.8 K/UL

## 2025-04-09 PROCEDURE — 36416 COLLJ CAPILLARY BLOOD SPEC: CPT

## 2025-04-09 PROCEDURE — 85025 COMPLETE CBC W/AUTO DIFF WBC: CPT

## 2025-04-10 DIAGNOSIS — D47.3 ESSENTIAL (HEMORRHAGIC) THROMBOCYTHEMIA: ICD-10-CM

## 2025-05-21 ENCOUNTER — APPOINTMENT (OUTPATIENT)
Age: 65
End: 2025-05-21
Payer: COMMERCIAL

## 2025-05-21 ENCOUNTER — OUTPATIENT (OUTPATIENT)
Dept: OUTPATIENT SERVICES | Facility: HOSPITAL | Age: 65
LOS: 1 days | End: 2025-05-21
Payer: COMMERCIAL

## 2025-05-21 VITALS
TEMPERATURE: 98.4 F | HEART RATE: 77 BPM | BODY MASS INDEX: 33.51 KG/M2 | SYSTOLIC BLOOD PRESSURE: 160 MMHG | HEIGHT: 66.5 IN | DIASTOLIC BLOOD PRESSURE: 99 MMHG | OXYGEN SATURATION: 96 % | WEIGHT: 211 LBS

## 2025-05-21 DIAGNOSIS — Z51.81 ENCOUNTER FOR THERAPEUTIC DRUG LVL MONITORING: ICD-10-CM

## 2025-05-21 DIAGNOSIS — D47.3 ESSENTIAL (HEMORRHAGIC) THROMBOCYTHEMIA: ICD-10-CM

## 2025-05-21 LAB
AUTO BASOPHILS #: 0.09 K/UL
AUTO BASOPHILS %: 1.1 %
AUTO EOSINOPHILS #: 0.17 K/UL
AUTO EOSINOPHILS %: 2 %
AUTO IMMATURE GRANULOCYTES #: 0.05 K/UL
AUTO LYMPHOCYTES #: 1.88 K/UL
AUTO LYMPHOCYTES %: 22.3 %
AUTO MONOCYTES #: 0.81 K/UL
AUTO MONOCYTES %: 9.6 %
AUTO NEUTROPHILS #: 5.42 K/UL
AUTO NEUTROPHILS %: 64.4 %
AUTO NRBC #: 0 K/UL
HCT VFR BLD CALC: 43.1 %
HGB BLD-MCNC: 14.6 G/DL
IMM GRANULOCYTES NFR BLD AUTO: 0.6 %
MAN DIFF?: NORMAL
MCHC RBC-ENTMCNC: 31.5 PG
MCHC RBC-ENTMCNC: 33.9 G/DL
MCV RBC AUTO: 93.1 FL
PLATELET # BLD AUTO: 386 K/UL
PMV BLD AUTO: 0 /100 WBCS
PMV BLD: 9.4 FL
RBC # BLD: 4.63 M/UL
RBC # FLD: 13.2 %
WBC # FLD AUTO: 8.42 K/UL

## 2025-05-21 PROCEDURE — 85025 COMPLETE CBC W/AUTO DIFF WBC: CPT

## 2025-05-21 PROCEDURE — 80048 BASIC METABOLIC PNL TOTAL CA: CPT

## 2025-05-21 PROCEDURE — G2211 COMPLEX E/M VISIT ADD ON: CPT | Mod: NC

## 2025-05-21 PROCEDURE — 99214 OFFICE O/P EST MOD 30 MIN: CPT

## 2025-05-21 PROCEDURE — 80076 HEPATIC FUNCTION PANEL: CPT

## 2025-05-22 DIAGNOSIS — D47.3 ESSENTIAL (HEMORRHAGIC) THROMBOCYTHEMIA: ICD-10-CM

## 2025-05-22 LAB
ALBUMIN SERPL ELPH-MCNC: 4.8 G/DL
ALP BLD-CCNC: 92 U/L
ALT SERPL-CCNC: 40 U/L
ANION GAP SERPL CALC-SCNC: 9 MMOL/L
AST SERPL-CCNC: 37 U/L
BILIRUB DIRECT SERPL-MCNC: 0.2 MG/DL
BILIRUB INDIRECT SERPL-MCNC: 0.3 MG/DL
BILIRUB SERPL-MCNC: 0.5 MG/DL
BUN SERPL-MCNC: 18 MG/DL
CALCIUM SERPL-MCNC: 10.1 MG/DL
CHLORIDE SERPL-SCNC: 105 MMOL/L
CO2 SERPL-SCNC: 25 MMOL/L
CREAT SERPL-MCNC: 1.1 MG/DL
EGFRCR SERPLBLD CKD-EPI 2021: 74 ML/MIN/1.73M2
GLUCOSE SERPL-MCNC: 100 MG/DL
POTASSIUM SERPL-SCNC: 5.1 MMOL/L
PROT SERPL-MCNC: 7.5 G/DL
SODIUM SERPL-SCNC: 139 MMOL/L

## 2025-06-13 ENCOUNTER — RESULT REVIEW (OUTPATIENT)
Age: 65
End: 2025-06-13

## 2025-06-13 ENCOUNTER — OUTPATIENT (OUTPATIENT)
Dept: OUTPATIENT SERVICES | Facility: HOSPITAL | Age: 65
LOS: 1 days | End: 2025-06-13
Payer: MEDICARE

## 2025-06-13 DIAGNOSIS — Z00.8 ENCOUNTER FOR OTHER GENERAL EXAMINATION: ICD-10-CM

## 2025-06-13 DIAGNOSIS — D47.3 ESSENTIAL (HEMORRHAGIC) THROMBOCYTHEMIA: ICD-10-CM

## 2025-06-13 PROCEDURE — A9579: CPT

## 2025-06-13 PROCEDURE — 74183 MRI ABD W/O CNTR FLWD CNTR: CPT | Mod: 26

## 2025-06-13 PROCEDURE — 74183 MRI ABD W/O CNTR FLWD CNTR: CPT

## 2025-06-14 DIAGNOSIS — D47.3 ESSENTIAL (HEMORRHAGIC) THROMBOCYTHEMIA: ICD-10-CM

## 2025-06-18 ENCOUNTER — APPOINTMENT (OUTPATIENT)
Age: 65
End: 2025-06-18
Payer: MEDICARE

## 2025-06-18 ENCOUNTER — OUTPATIENT (OUTPATIENT)
Dept: OUTPATIENT SERVICES | Facility: HOSPITAL | Age: 65
LOS: 1 days | End: 2025-06-18
Payer: MEDICARE

## 2025-06-18 VITALS
BODY MASS INDEX: 33.03 KG/M2 | HEIGHT: 66.5 IN | OXYGEN SATURATION: 98 % | HEART RATE: 71 BPM | WEIGHT: 208 LBS | SYSTOLIC BLOOD PRESSURE: 160 MMHG | RESPIRATION RATE: 16 BRPM | DIASTOLIC BLOOD PRESSURE: 103 MMHG | TEMPERATURE: 98.1 F

## 2025-06-18 DIAGNOSIS — Z51.81 ENCOUNTER FOR THERAPEUTIC DRUG LEVEL MONITORING: ICD-10-CM

## 2025-06-18 LAB
AUTO BASOPHILS #: 0.09 K/UL
AUTO BASOPHILS %: 1 %
AUTO EOSINOPHILS #: 0.16 K/UL
AUTO EOSINOPHILS %: 1.9 %
AUTO IMMATURE GRANULOCYTES #: 0.04 K/UL
AUTO LYMPHOCYTES #: 1.95 K/UL
AUTO LYMPHOCYTES %: 22.6 %
AUTO MONOCYTES #: 0.78 K/UL
AUTO MONOCYTES %: 9 %
AUTO NEUTROPHILS #: 5.61 K/UL
AUTO NEUTROPHILS %: 65 %
AUTO NRBC #: 0 K/UL
HCT VFR BLD CALC: 44.6 %
HGB BLD-MCNC: 15.5 G/DL
IMM GRANULOCYTES NFR BLD AUTO: 0.5 %
MAN DIFF?: NORMAL
MCHC RBC-ENTMCNC: 31.7 PG
MCHC RBC-ENTMCNC: 34.8 G/DL
MCV RBC AUTO: 91.2 FL
PLATELET # BLD AUTO: 340 K/UL
PMV BLD AUTO: 0 /100 WBCS
PMV BLD: 10.4 FL
RBC # BLD: 4.89 M/UL
RBC # FLD: 12.5 %
WBC # FLD AUTO: 8.63 K/UL

## 2025-06-18 PROCEDURE — G2211 COMPLEX E/M VISIT ADD ON: CPT

## 2025-06-18 PROCEDURE — 99214 OFFICE O/P EST MOD 30 MIN: CPT

## 2025-06-18 PROCEDURE — 85025 COMPLETE CBC W/AUTO DIFF WBC: CPT

## 2025-06-19 DIAGNOSIS — Z51.81 ENCOUNTER FOR THERAPEUTIC DRUG LEVEL MONITORING: ICD-10-CM

## 2025-08-27 ENCOUNTER — APPOINTMENT (OUTPATIENT)
Age: 65
End: 2025-08-27
Payer: COMMERCIAL

## 2025-08-27 VITALS
WEIGHT: 206 LBS | TEMPERATURE: 97.4 F | BODY MASS INDEX: 32.72 KG/M2 | SYSTOLIC BLOOD PRESSURE: 143 MMHG | RESPIRATION RATE: 16 BRPM | DIASTOLIC BLOOD PRESSURE: 91 MMHG | OXYGEN SATURATION: 99 % | HEART RATE: 75 BPM | HEIGHT: 66.5 IN

## 2025-08-27 DIAGNOSIS — E78.00 PURE HYPERCHOLESTEROLEMIA, UNSPECIFIED: ICD-10-CM

## 2025-08-27 DIAGNOSIS — K21.9 GASTRO-ESOPHAGEAL REFLUX DISEASE W/OUT ESOPHAGITIS: ICD-10-CM

## 2025-08-27 DIAGNOSIS — Z51.81 ENCOUNTER FOR THERAPEUTIC DRUG LVL MONITORING: ICD-10-CM

## 2025-08-27 DIAGNOSIS — I10 ESSENTIAL (PRIMARY) HYPERTENSION: ICD-10-CM

## 2025-08-27 DIAGNOSIS — D47.3 ESSENTIAL (HEMORRHAGIC) THROMBOCYTHEMIA: ICD-10-CM

## 2025-08-27 LAB
ALBUMIN SERPL ELPH-MCNC: 5.1 G/DL
ALP BLD-CCNC: 88 U/L
ALT SERPL-CCNC: 41 U/L
ANION GAP SERPL CALC-SCNC: 13 MMOL/L
AST SERPL-CCNC: 29 U/L
AUTO BASOPHILS #: 0.09 K/UL
AUTO BASOPHILS %: 0.9 %
AUTO EOSINOPHILS #: 0.15 K/UL
AUTO EOSINOPHILS %: 1.6 %
AUTO IMMATURE GRANULOCYTES #: 0.05 K/UL
AUTO LYMPHOCYTES #: 2.08 K/UL
AUTO LYMPHOCYTES %: 21.7 %
AUTO MONOCYTES #: 0.88 K/UL
AUTO MONOCYTES %: 9.2 %
AUTO NEUTROPHILS #: 6.33 K/UL
AUTO NEUTROPHILS %: 66.1 %
AUTO NRBC #: 0 K/UL
BILIRUB DIRECT SERPL-MCNC: 0.2 MG/DL
BILIRUB INDIRECT SERPL-MCNC: 0.4 MG/DL
BILIRUB SERPL-MCNC: 0.6 MG/DL
BUN SERPL-MCNC: 17 MG/DL
CALCIUM SERPL-MCNC: 9.9 MG/DL
CHLORIDE SERPL-SCNC: 101 MMOL/L
CO2 SERPL-SCNC: 23 MMOL/L
CREAT SERPL-MCNC: 1.2 MG/DL
EGFRCR SERPLBLD CKD-EPI 2021: 67 ML/MIN/1.73M2
GLUCOSE SERPL-MCNC: 97 MG/DL
HCT VFR BLD CALC: 45.4 %
HGB BLD-MCNC: 15.6 G/DL
IMM GRANULOCYTES NFR BLD AUTO: 0.5 %
MAN DIFF?: NORMAL
MCHC RBC-ENTMCNC: 30.6 PG
MCHC RBC-ENTMCNC: 34.4 G/DL
MCV RBC AUTO: 89 FL
PLATELET # BLD AUTO: 442 K/UL
PMV BLD AUTO: 0 /100 WBCS
PMV BLD: 9.3 FL
POTASSIUM SERPL-SCNC: 5.5 MMOL/L
PROT SERPL-MCNC: 7.5 G/DL
RBC # BLD: 5.1 M/UL
RBC # FLD: 13 %
SODIUM SERPL-SCNC: 137 MMOL/L
WBC # FLD AUTO: 9.58 K/UL

## 2025-08-27 PROCEDURE — G2211 COMPLEX E/M VISIT ADD ON: CPT

## 2025-08-27 PROCEDURE — 99204 OFFICE O/P NEW MOD 45 MIN: CPT
